# Patient Record
Sex: FEMALE | Race: WHITE | NOT HISPANIC OR LATINO | Employment: FULL TIME | URBAN - METROPOLITAN AREA
[De-identification: names, ages, dates, MRNs, and addresses within clinical notes are randomized per-mention and may not be internally consistent; named-entity substitution may affect disease eponyms.]

---

## 2023-03-13 DIAGNOSIS — F41.9 ANXIETY DISORDER, UNSPECIFIED: ICD-10-CM

## 2023-03-15 RX ORDER — SERTRALINE HYDROCHLORIDE 50 MG/1
TABLET, FILM COATED ORAL
Qty: 90 TABLET | Refills: 1 | Status: SHIPPED | OUTPATIENT
Start: 2023-03-15 | End: 2023-10-09

## 2023-10-07 DIAGNOSIS — J30.9 ALLERGIC RHINITIS, UNSPECIFIED SEASONALITY, UNSPECIFIED TRIGGER: Primary | ICD-10-CM

## 2023-10-07 DIAGNOSIS — F41.9 ANXIETY DISORDER, UNSPECIFIED: ICD-10-CM

## 2023-10-09 RX ORDER — SERTRALINE HYDROCHLORIDE 50 MG/1
TABLET, FILM COATED ORAL
Qty: 90 TABLET | Refills: 1 | Status: SHIPPED | OUTPATIENT
Start: 2023-10-09 | End: 2023-11-15 | Stop reason: SDUPTHER

## 2023-10-09 RX ORDER — LORATADINE 10 MG/1
10 TABLET ORAL NIGHTLY
Qty: 30 TABLET | Refills: 2 | Status: SHIPPED | OUTPATIENT
Start: 2023-10-09 | End: 2023-11-06

## 2023-11-05 DIAGNOSIS — J30.9 ALLERGIC RHINITIS, UNSPECIFIED SEASONALITY, UNSPECIFIED TRIGGER: ICD-10-CM

## 2023-11-06 RX ORDER — LORATADINE 10 MG/1
10 TABLET ORAL NIGHTLY
Qty: 90 TABLET | Refills: 1 | Status: SHIPPED | OUTPATIENT
Start: 2023-11-06

## 2023-11-15 ENCOUNTER — OFFICE VISIT (OUTPATIENT)
Dept: PRIMARY CARE | Facility: CLINIC | Age: 53
End: 2023-11-15
Payer: COMMERCIAL

## 2023-11-15 VITALS
DIASTOLIC BLOOD PRESSURE: 84 MMHG | HEART RATE: 75 BPM | OXYGEN SATURATION: 99 % | BODY MASS INDEX: 40.63 KG/M2 | WEIGHT: 238 LBS | HEIGHT: 64 IN | SYSTOLIC BLOOD PRESSURE: 136 MMHG

## 2023-11-15 DIAGNOSIS — R11.0 NAUSEA: ICD-10-CM

## 2023-11-15 DIAGNOSIS — K21.9 GASTROESOPHAGEAL REFLUX DISEASE WITHOUT ESOPHAGITIS: ICD-10-CM

## 2023-11-15 DIAGNOSIS — Z79.899 CONTROLLED SUBSTANCE AGREEMENT SIGNED: ICD-10-CM

## 2023-11-15 DIAGNOSIS — Z00.00 PHYSICAL EXAM, ANNUAL: Primary | ICD-10-CM

## 2023-11-15 DIAGNOSIS — K58.9 IRRITABLE BOWEL SYNDROME, UNSPECIFIED TYPE: ICD-10-CM

## 2023-11-15 DIAGNOSIS — F41.9 ANXIETY DISORDER, UNSPECIFIED: ICD-10-CM

## 2023-11-15 PROBLEM — J06.9 URTI (ACUTE UPPER RESPIRATORY INFECTION): Status: RESOLVED | Noted: 2023-11-15 | Resolved: 2023-11-15

## 2023-11-15 PROBLEM — M62.838 NECK MUSCLE SPASM: Status: RESOLVED | Noted: 2023-11-15 | Resolved: 2023-11-15

## 2023-11-15 PROBLEM — R11.2 NAUSEA AND VOMITING: Status: ACTIVE | Noted: 2023-11-15

## 2023-11-15 PROBLEM — I95.1 ORTHOSTATIC HYPOTENSION: Status: ACTIVE | Noted: 2023-11-15

## 2023-11-15 PROBLEM — R07.89 ATYPICAL CHEST PAIN: Status: RESOLVED | Noted: 2023-11-15 | Resolved: 2023-11-15

## 2023-11-15 PROBLEM — K13.70 MOUTH LESION: Status: RESOLVED | Noted: 2023-11-15 | Resolved: 2023-11-15

## 2023-11-15 PROBLEM — J18.9 RIGHT LOWER LOBE PNEUMONIA: Status: RESOLVED | Noted: 2023-11-15 | Resolved: 2023-11-15

## 2023-11-15 PROBLEM — J30.2 SEASONAL ALLERGIES: Status: ACTIVE | Noted: 2023-11-15

## 2023-11-15 PROBLEM — J45.909 ASTHMA (HHS-HCC): Status: ACTIVE | Noted: 2023-11-15

## 2023-11-15 PROBLEM — M26.69 TMJ CREPITUS: Status: ACTIVE | Noted: 2023-11-15

## 2023-11-15 PROBLEM — M75.41 IMPINGEMENT SYNDROME OF RIGHT SHOULDER: Status: ACTIVE | Noted: 2023-11-15

## 2023-11-15 PROBLEM — R42 VERTIGO: Status: ACTIVE | Noted: 2023-11-15

## 2023-11-15 PROBLEM — R05.9 COUGH: Status: RESOLVED | Noted: 2023-11-15 | Resolved: 2023-11-15

## 2023-11-15 PROBLEM — R09.89 CHEST CONGESTION: Status: RESOLVED | Noted: 2023-11-15 | Resolved: 2023-11-15

## 2023-11-15 PROBLEM — S61.019A LACERATION OF THUMB: Status: RESOLVED | Noted: 2023-11-15 | Resolved: 2023-11-15

## 2023-11-15 PROBLEM — K59.09 CHRONIC CONSTIPATION: Status: ACTIVE | Noted: 2023-11-15

## 2023-11-15 PROBLEM — R10.9 ABDOMINAL PAIN: Status: RESOLVED | Noted: 2023-11-15 | Resolved: 2023-11-15

## 2023-11-15 PROBLEM — R51.9 HEADACHE: Status: ACTIVE | Noted: 2023-11-15

## 2023-11-15 PROBLEM — S02.2XXA NASAL FRACTURE: Status: RESOLVED | Noted: 2023-11-15 | Resolved: 2023-11-15

## 2023-11-15 PROBLEM — H93.8X3: Status: RESOLVED | Noted: 2023-11-15 | Resolved: 2023-11-15

## 2023-11-15 PROBLEM — R55 SYNCOPE AND COLLAPSE: Status: ACTIVE | Noted: 2023-11-15

## 2023-11-15 PROBLEM — I47.10 PAROXYSMAL SVT (SUPRAVENTRICULAR TACHYCARDIA) (CMS-HCC): Status: ACTIVE | Noted: 2023-11-15

## 2023-11-15 PROBLEM — G47.00 INSOMNIA: Status: ACTIVE | Noted: 2023-11-15

## 2023-11-15 PROBLEM — O24.419 DIABETES, GESTATIONAL (HHS-HCC): Status: ACTIVE | Noted: 2023-11-15

## 2023-11-15 PROBLEM — M79.2 RADICULAR PAIN OF RIGHT UPPER EXTREMITY: Status: RESOLVED | Noted: 2023-11-15 | Resolved: 2023-11-15

## 2023-11-15 PROBLEM — E53.8 VITAMIN B 12 DEFICIENCY: Status: ACTIVE | Noted: 2023-11-15

## 2023-11-15 PROBLEM — M54.12 RIGHT CERVICAL RADICULOPATHY: Status: RESOLVED | Noted: 2023-11-15 | Resolved: 2023-11-15

## 2023-11-15 PROBLEM — M25.511 SHOULDER PAIN, RIGHT: Status: RESOLVED | Noted: 2023-11-15 | Resolved: 2023-11-15

## 2023-11-15 LAB
25(OH)D3 SERPL-MCNC: 10 NG/ML (ref 30–100)
ALBUMIN SERPL BCP-MCNC: 4.5 G/DL (ref 3.4–5)
ALP SERPL-CCNC: 99 U/L (ref 33–110)
ALT SERPL W P-5'-P-CCNC: 17 U/L (ref 7–45)
ANION GAP SERPL CALC-SCNC: 13 MMOL/L (ref 10–20)
AST SERPL W P-5'-P-CCNC: 17 U/L (ref 9–39)
BASOPHILS # BLD AUTO: 0.05 X10*3/UL (ref 0–0.1)
BASOPHILS NFR BLD AUTO: 1 %
BILIRUB SERPL-MCNC: 0.4 MG/DL (ref 0–1.2)
BUN SERPL-MCNC: 12 MG/DL (ref 6–23)
CALCIUM SERPL-MCNC: 9.8 MG/DL (ref 8.6–10.6)
CHLORIDE SERPL-SCNC: 105 MMOL/L (ref 98–107)
CHOLEST SERPL-MCNC: 221 MG/DL (ref 0–199)
CHOLESTEROL/HDL RATIO: 3.1
CO2 SERPL-SCNC: 26 MMOL/L (ref 21–32)
CREAT SERPL-MCNC: 0.93 MG/DL (ref 0.5–1.05)
EOSINOPHIL # BLD AUTO: 0.11 X10*3/UL (ref 0–0.7)
EOSINOPHIL NFR BLD AUTO: 2.2 %
ERYTHROCYTE [DISTWIDTH] IN BLOOD BY AUTOMATED COUNT: 14.2 % (ref 11.5–14.5)
EST. AVERAGE GLUCOSE BLD GHB EST-MCNC: 108 MG/DL
GFR SERPL CREATININE-BSD FRML MDRD: 75 ML/MIN/1.73M*2
GLUCOSE SERPL-MCNC: 97 MG/DL (ref 74–99)
HBA1C MFR BLD: 5.4 %
HCT VFR BLD AUTO: 43.2 % (ref 36–46)
HDLC SERPL-MCNC: 72.1 MG/DL
HGB BLD-MCNC: 13.2 G/DL (ref 12–16)
IMM GRANULOCYTES # BLD AUTO: 0.01 X10*3/UL (ref 0–0.7)
IMM GRANULOCYTES NFR BLD AUTO: 0.2 % (ref 0–0.9)
LDLC SERPL CALC-MCNC: 137 MG/DL
LYMPHOCYTES # BLD AUTO: 1.84 X10*3/UL (ref 1.2–4.8)
LYMPHOCYTES NFR BLD AUTO: 36.5 %
MCH RBC QN AUTO: 26.6 PG (ref 26–34)
MCHC RBC AUTO-ENTMCNC: 30.6 G/DL (ref 32–36)
MCV RBC AUTO: 87 FL (ref 80–100)
MONOCYTES # BLD AUTO: 0.34 X10*3/UL (ref 0.1–1)
MONOCYTES NFR BLD AUTO: 6.7 %
NEUTROPHILS # BLD AUTO: 2.69 X10*3/UL (ref 1.2–7.7)
NEUTROPHILS NFR BLD AUTO: 53.4 %
NON HDL CHOLESTEROL: 149 MG/DL (ref 0–149)
NRBC BLD-RTO: 0 /100 WBCS (ref 0–0)
PLATELET # BLD AUTO: 332 X10*3/UL (ref 150–450)
POTASSIUM SERPL-SCNC: 4.6 MMOL/L (ref 3.5–5.3)
PROT SERPL-MCNC: 6.9 G/DL (ref 6.4–8.2)
RBC # BLD AUTO: 4.97 X10*6/UL (ref 4–5.2)
SODIUM SERPL-SCNC: 139 MMOL/L (ref 136–145)
TRIGL SERPL-MCNC: 60 MG/DL (ref 0–149)
TSH SERPL-ACNC: 1.16 MIU/L (ref 0.44–3.98)
VLDL: 12 MG/DL (ref 0–40)
WBC # BLD AUTO: 5 X10*3/UL (ref 4.4–11.3)

## 2023-11-15 PROCEDURE — 3075F SYST BP GE 130 - 139MM HG: CPT | Performed by: STUDENT IN AN ORGANIZED HEALTH CARE EDUCATION/TRAINING PROGRAM

## 2023-11-15 PROCEDURE — 82306 VITAMIN D 25 HYDROXY: CPT

## 2023-11-15 PROCEDURE — 80345 DRUG SCREENING BARBITURATES: CPT

## 2023-11-15 PROCEDURE — 3079F DIAST BP 80-89 MM HG: CPT | Performed by: STUDENT IN AN ORGANIZED HEALTH CARE EDUCATION/TRAINING PROGRAM

## 2023-11-15 PROCEDURE — 80061 LIPID PANEL: CPT

## 2023-11-15 PROCEDURE — 36415 COLL VENOUS BLD VENIPUNCTURE: CPT

## 2023-11-15 PROCEDURE — 85025 COMPLETE CBC W/AUTO DIFF WBC: CPT

## 2023-11-15 PROCEDURE — 80307 DRUG TEST PRSMV CHEM ANLYZR: CPT

## 2023-11-15 PROCEDURE — 99396 PREV VISIT EST AGE 40-64: CPT | Performed by: STUDENT IN AN ORGANIZED HEALTH CARE EDUCATION/TRAINING PROGRAM

## 2023-11-15 PROCEDURE — 84443 ASSAY THYROID STIM HORMONE: CPT

## 2023-11-15 PROCEDURE — 83036 HEMOGLOBIN GLYCOSYLATED A1C: CPT

## 2023-11-15 PROCEDURE — 80053 COMPREHEN METABOLIC PANEL: CPT

## 2023-11-15 RX ORDER — SERTRALINE HYDROCHLORIDE 50 MG/1
50 TABLET, FILM COATED ORAL DAILY
Qty: 90 TABLET | Refills: 3 | Status: SHIPPED | OUTPATIENT
Start: 2023-11-15

## 2023-11-15 RX ORDER — PANTOPRAZOLE SODIUM 40 MG/1
TABLET, DELAYED RELEASE ORAL
Qty: 90 TABLET | Refills: 3 | Status: SHIPPED | OUTPATIENT
Start: 2023-11-15

## 2023-11-15 RX ORDER — PHENOBARBITAL, HYOSCYAMINE SULFATE, ATROPINE SULFATE AND SCOPOLAMINE HYDROBROMIDE .0194; .1037; 16.2; .0065 MG/1; MG/1; MG/1; MG/1
TABLET ORAL
COMMUNITY
Start: 2023-11-05 | End: 2023-11-15 | Stop reason: SDUPTHER

## 2023-11-15 RX ORDER — GUAIFENESIN 600 MG/1
TABLET, EXTENDED RELEASE ORAL
COMMUNITY

## 2023-11-15 RX ORDER — PANTOPRAZOLE SODIUM 40 MG/1
TABLET, DELAYED RELEASE ORAL
COMMUNITY
End: 2023-11-15 | Stop reason: SDUPTHER

## 2023-11-15 RX ORDER — PHENOBARBITAL, HYOSCYAMINE SULFATE, ATROPINE SULFATE AND SCOPOLAMINE HYDROBROMIDE .0194; .1037; 16.2; .0065 MG/1; MG/1; MG/1; MG/1
TABLET ORAL
Qty: 60 TABLET | Refills: 2 | Status: SHIPPED | OUTPATIENT
Start: 2023-11-15 | End: 2024-03-18

## 2023-11-15 ASSESSMENT — ENCOUNTER SYMPTOMS
CONFUSION: 0
NEUROLOGICAL NEGATIVE: 1
DIZZINESS: 0
WOUND: 0
SLEEP DISTURBANCE: 0
NERVOUS/ANXIOUS: 0
FACIAL ASYMMETRY: 0
HEMATOLOGIC/LYMPHATIC NEGATIVE: 1
POLYPHAGIA: 0
EYES NEGATIVE: 1
LIGHT-HEADEDNESS: 0
MUSCULOSKELETAL NEGATIVE: 1
RESPIRATORY NEGATIVE: 1
PSYCHIATRIC NEGATIVE: 1
GASTROINTESTINAL NEGATIVE: 1
CARDIOVASCULAR NEGATIVE: 1
CONSTITUTIONAL NEGATIVE: 1
WEAKNESS: 0

## 2023-11-15 NOTE — PROGRESS NOTES
"Subjective   Patient ID: Tami Johnson is a 53 y.o. female who presents for Annual Exam (Is fasting).    HPI     Review of Systems    Objective   /84 (BP Location: Right arm, Patient Position: Sitting)   Pulse 75   Ht 1.626 m (5' 4\")   Wt 108 kg (238 lb)   SpO2 99%   BMI 40.85 kg/m²     Physical Exam    Assessment/Plan          "

## 2023-11-15 NOTE — PROGRESS NOTES
"Subjective   Patient ID: Tami Johnson is a 53 y.o. female who presents for Annual Exam (Is fasting).    HPI   Presents to clinic for annual exam  Recently treated for sinus infection at urgent care. Pt states over past several\" lump\" on the outside of her right ear. States it started after she got sick but has somewhat decreased.   Denies any problems with hearing. No pain present.   Also c/o tailbone pain. Worse with sitting denies any falls or recent injury.   Has tried over the counter Topical cream ( icy hot) with relief.    IBS on donnatol from GI states its only thing that works, looked at her GI ntoes    OARRS:  Ander Velazquez, DO on 11/15/2023 11:43 AM  I have personally reviewed the OARRS report for Tami Johnson. I have considered the risks of abuse, dependence, addiction and diversion    Is the patient prescribed a combination of a benzodiazepine and opioid?  Yes, I feel it is clincially indicated to continue the medication and have discussed with the patient risks/benefits/alternatives.    Last Urine Drug Screen / ordered today: No  No results found for this or any previous visit (from the past 8760 hour(s)).  Results are as expected.           Controlled Substance Agreement:  Date of the Last Agreement: 11/15/23  Reviewed Controlled Substance Agreement including but not limited to the benefits, risks, and alternatives to treatment with a Controlled Substance medication(s).    Antidiarrheal:   What is the patient's goal of therapy? Anti diarrha  Is this being achieved with current treatment? yes  Is the patient currently prescribed an opioid, and/or benzodiazepine?   No    Activities of Daily Living:   Is your overall impression that this patient is benefiting (symptom reduction outweighs side effects) from antidiarrheal therapy? Yes     1. Physical Functioning: Better  2. Family Relationship: Better  3. Social Relationship: Better  4. Mood: Better  5. Sleep Patterns: Better  6. Overall Function: " "Better        UTD on Flu vaccine.   UTD on Mammogram screening   UTD on Colorectal screening   Discussed COVID vaccinations.      Review of Systems   Constitutional: Negative.    Eyes: Negative.    Respiratory: Negative.     Cardiovascular: Negative.    Gastrointestinal: Negative.    Endocrine: Negative for polyphagia.   Genitourinary: Negative.    Musculoskeletal: Negative.    Skin: Negative.  Negative for pallor, rash and wound.   Neurological: Negative.  Negative for dizziness, facial asymmetry, weakness and light-headedness.   Hematological: Negative.    Psychiatric/Behavioral: Negative.  Negative for confusion, sleep disturbance and suicidal ideas. The patient is not nervous/anxious.    All other systems reviewed and are negative.      Objective   /84 (BP Location: Right arm, Patient Position: Sitting)   Pulse 75   Ht 1.626 m (5' 4\")   Wt 108 kg (238 lb)   SpO2 99%   BMI 40.85 kg/m²     Physical Exam  Vitals and nursing note reviewed. Exam conducted with a chaperone present.   Constitutional:       Appearance: Normal appearance. She is normal weight.   HENT:      Head: Normocephalic.      Ears:      Comments: Noted with palpable, pain free nodule to near the tragus     Nose: Nose normal.      Mouth/Throat:      Mouth: Mucous membranes are moist.   Eyes:      Pupils: Pupils are equal, round, and reactive to light.   Cardiovascular:      Rate and Rhythm: Normal rate and regular rhythm.   Pulmonary:      Effort: Pulmonary effort is normal.   Abdominal:      General: Abdomen is flat. Bowel sounds are normal.      Palpations: Abdomen is soft.   Genitourinary:     Rectum: Normal.   Musculoskeletal:         General: Normal range of motion.      Cervical back: Normal range of motion.   Skin:     General: Skin is dry.   Neurological:      General: No focal deficit present.      Mental Status: She is alert and oriented to person, place, and time.   Psychiatric:         Mood and Affect: Mood normal.         " Behavior: Behavior normal.         Assessment/Plan   1. Physical exam, annual  - CBC and Auto Differential  - Comprehensive Metabolic Panel  - Lipid Panel  - TSH with reflex to Free T4 if abnormal  - Hemoglobin A1C  - Vitamin D 25-Hydroxy,Total (for eval of Vitamin D levels)    Mammogram/ Colorectal screening UTD  Flu vaccination UTD    2. Anxiety disorder, unspecified  - sertraline (Zoloft) 50 mg tablet; Take 1 tablet (50 mg) by mouth once daily.  Dispense: 90 tablet; Refill: 3    3. Gastroesophageal reflux disease without esophagitis  - pantoprazole (ProtoNix) 40 mg EC tablet; TAKE 1 TABLET BY MOUTH TWICE DAILY 30 MINUTES BEFORE BREAKFAST AND DINNER *NEEDS APPT FOR REFILLS*  Dispense: 90 tablet; Refill: 3    4. Nausea      5. Irritable bowel syndrome, unspecified type  Refill donnatol  [facility]  Oarrs reviwed        FU in clinic in 1 yr. Or sooner if problems arise.

## 2023-11-16 LAB
AMPHETAMINES UR QL SCN: ABNORMAL
BARBITURATES UR QL SCN: ABNORMAL
BENZODIAZ UR QL SCN: ABNORMAL
BZE UR QL SCN: ABNORMAL
CANNABINOIDS UR QL SCN: ABNORMAL
FENTANYL+NORFENTANYL UR QL SCN: ABNORMAL
OPIATES UR QL SCN: ABNORMAL
OXYCODONE+OXYMORPHONE UR QL SCN: ABNORMAL
PCP UR QL SCN: ABNORMAL

## 2023-11-22 LAB
BUTALBITAL, URN, QUANT: <50 NG/ML
PENTOBARBITAL, URN, QUANT: <50 NG/ML
PHENOBARBITAL, URN, QUANT: 3548 NG/ML

## 2024-03-13 ENCOUNTER — APPOINTMENT (OUTPATIENT)
Dept: PRIMARY CARE | Facility: CLINIC | Age: 54
End: 2024-03-13
Payer: COMMERCIAL

## 2024-03-16 DIAGNOSIS — R11.0 NAUSEA: ICD-10-CM

## 2024-03-16 DIAGNOSIS — K58.9 IRRITABLE BOWEL SYNDROME, UNSPECIFIED TYPE: ICD-10-CM

## 2024-03-18 ENCOUNTER — APPOINTMENT (OUTPATIENT)
Dept: PRIMARY CARE | Facility: CLINIC | Age: 54
End: 2024-03-18
Payer: COMMERCIAL

## 2024-03-18 RX ORDER — PHENOBARBITAL, HYOSCYAMINE SULFATE, ATROPINE SULFATE AND SCOPOLAMINE HYDROBROMIDE .0194; .1037; 16.2; .0065 MG/1; MG/1; MG/1; MG/1
TABLET ORAL
Qty: 60 TABLET | Refills: 2 | Status: SHIPPED | OUTPATIENT
Start: 2024-03-18 | End: 2024-04-11 | Stop reason: SDUPTHER

## 2024-04-11 ENCOUNTER — OFFICE VISIT (OUTPATIENT)
Dept: PRIMARY CARE | Facility: CLINIC | Age: 54
End: 2024-04-11
Payer: COMMERCIAL

## 2024-04-11 VITALS — HEIGHT: 64 IN | BODY MASS INDEX: 41.15 KG/M2 | WEIGHT: 241 LBS

## 2024-04-11 DIAGNOSIS — K59.09 CHRONIC CONSTIPATION: ICD-10-CM

## 2024-04-11 DIAGNOSIS — M19.041 ARTHRITIS OF FINGER OF BOTH HANDS: ICD-10-CM

## 2024-04-11 DIAGNOSIS — R42 VERTIGO: Primary | ICD-10-CM

## 2024-04-11 DIAGNOSIS — K58.9 IRRITABLE BOWEL SYNDROME, UNSPECIFIED TYPE: ICD-10-CM

## 2024-04-11 DIAGNOSIS — D50.9 IRON DEFICIENCY ANEMIA, UNSPECIFIED IRON DEFICIENCY ANEMIA TYPE: ICD-10-CM

## 2024-04-11 DIAGNOSIS — N80.9 ENDOMETRIOSIS: ICD-10-CM

## 2024-04-11 DIAGNOSIS — M19.042 ARTHRITIS OF FINGER OF BOTH HANDS: ICD-10-CM

## 2024-04-11 DIAGNOSIS — Z79.899 HIGH RISK MEDICATION USE: ICD-10-CM

## 2024-04-11 LAB
ALBUMIN SERPL BCP-MCNC: 4.3 G/DL (ref 3.4–5)
ALP SERPL-CCNC: 113 U/L (ref 33–110)
ALT SERPL W P-5'-P-CCNC: 19 U/L (ref 7–45)
ANION GAP SERPL CALC-SCNC: 14 MMOL/L (ref 10–20)
AST SERPL W P-5'-P-CCNC: 13 U/L (ref 9–39)
BASOPHILS # BLD AUTO: 0.04 X10*3/UL (ref 0–0.1)
BASOPHILS NFR BLD AUTO: 0.7 %
BILIRUB SERPL-MCNC: 0.4 MG/DL (ref 0–1.2)
BUN SERPL-MCNC: 12 MG/DL (ref 6–23)
CALCIUM SERPL-MCNC: 9.6 MG/DL (ref 8.6–10.6)
CHLORIDE SERPL-SCNC: 105 MMOL/L (ref 98–107)
CO2 SERPL-SCNC: 26 MMOL/L (ref 21–32)
CREAT SERPL-MCNC: 0.83 MG/DL (ref 0.5–1.05)
EGFRCR SERPLBLD CKD-EPI 2021: 85 ML/MIN/1.73M*2
EOSINOPHIL # BLD AUTO: 0.07 X10*3/UL (ref 0–0.7)
EOSINOPHIL NFR BLD AUTO: 1.2 %
ERYTHROCYTE [DISTWIDTH] IN BLOOD BY AUTOMATED COUNT: 14 % (ref 11.5–14.5)
ERYTHROCYTE [SEDIMENTATION RATE] IN BLOOD BY WESTERGREN METHOD: 12 MM/H (ref 0–30)
GLUCOSE SERPL-MCNC: 100 MG/DL (ref 74–99)
HCT VFR BLD AUTO: 44.1 % (ref 36–46)
HGB BLD-MCNC: 13.6 G/DL (ref 12–16)
IMM GRANULOCYTES # BLD AUTO: 0.02 X10*3/UL (ref 0–0.7)
IMM GRANULOCYTES NFR BLD AUTO: 0.4 % (ref 0–0.9)
LYMPHOCYTES # BLD AUTO: 1.53 X10*3/UL (ref 1.2–4.8)
LYMPHOCYTES NFR BLD AUTO: 26.9 %
MCH RBC QN AUTO: 26.4 PG (ref 26–34)
MCHC RBC AUTO-ENTMCNC: 30.8 G/DL (ref 32–36)
MCV RBC AUTO: 86 FL (ref 80–100)
MONOCYTES # BLD AUTO: 0.34 X10*3/UL (ref 0.1–1)
MONOCYTES NFR BLD AUTO: 6 %
NEUTROPHILS # BLD AUTO: 3.69 X10*3/UL (ref 1.2–7.7)
NEUTROPHILS NFR BLD AUTO: 64.8 %
NRBC BLD-RTO: 0 /100 WBCS (ref 0–0)
PLATELET # BLD AUTO: 324 X10*3/UL (ref 150–450)
POTASSIUM SERPL-SCNC: 4.6 MMOL/L (ref 3.5–5.3)
PROT SERPL-MCNC: 6.7 G/DL (ref 6.4–8.2)
RBC # BLD AUTO: 5.15 X10*6/UL (ref 4–5.2)
RHEUMATOID FACT SER NEPH-ACNC: <10 IU/ML (ref 0–15)
SODIUM SERPL-SCNC: 140 MMOL/L (ref 136–145)
URATE SERPL-MCNC: 4.6 MG/DL (ref 2.3–6.7)
WBC # BLD AUTO: 5.7 X10*3/UL (ref 4.4–11.3)

## 2024-04-11 PROCEDURE — 99214 OFFICE O/P EST MOD 30 MIN: CPT | Performed by: FAMILY MEDICINE

## 2024-04-11 PROCEDURE — 86431 RHEUMATOID FACTOR QUANT: CPT

## 2024-04-11 PROCEDURE — 80053 COMPREHEN METABOLIC PANEL: CPT

## 2024-04-11 PROCEDURE — 36415 COLL VENOUS BLD VENIPUNCTURE: CPT

## 2024-04-11 PROCEDURE — 86038 ANTINUCLEAR ANTIBODIES: CPT

## 2024-04-11 PROCEDURE — 85652 RBC SED RATE AUTOMATED: CPT

## 2024-04-11 PROCEDURE — 85025 COMPLETE CBC W/AUTO DIFF WBC: CPT

## 2024-04-11 PROCEDURE — 1036F TOBACCO NON-USER: CPT | Performed by: FAMILY MEDICINE

## 2024-04-11 PROCEDURE — 84550 ASSAY OF BLOOD/URIC ACID: CPT

## 2024-04-11 RX ORDER — NORETHINDRONE 0.35 MG/1
1 TABLET ORAL DAILY
COMMUNITY
Start: 2024-03-16

## 2024-04-11 RX ORDER — PHENOBARBITAL, HYOSCYAMINE SULFATE, ATROPINE SULFATE AND SCOPOLAMINE HYDROBROMIDE .0194; .1037; 16.2; .0065 MG/1; MG/1; MG/1; MG/1
TABLET ORAL
Qty: 60 TABLET | Refills: 3 | Status: SHIPPED | OUTPATIENT
Start: 2024-04-11

## 2024-04-11 ASSESSMENT — PATIENT HEALTH QUESTIONNAIRE - PHQ9
SUM OF ALL RESPONSES TO PHQ9 QUESTIONS 1 AND 2: 0
2. FEELING DOWN, DEPRESSED OR HOPELESS: NOT AT ALL
1. LITTLE INTEREST OR PLEASURE IN DOING THINGS: NOT AT ALL

## 2024-04-11 NOTE — PROGRESS NOTES
Subjective   Tami Johnson is a 53 y.o. female who presents for Follow-up (Patient here was prescription refills today).    HPI  : Patient is a 53-year-old female who needed a refill on a controlled medication today for her irritable bowel syndrome.  She has been through this problem for many years and the Donnatal is the only medication that really helps her and she has seen gastroenterology in the past who apparently recommended.  She will sign the E consent form and I will review her OARRS report.  She also was fasting and wanted some blood work rechecked since she is concerned about her weight, her sugar, and her endometriosis.  If she does not take her specific medication for endometriosis she apparently gets heavy bleeding vaginally.  Patient otherwise has arthritic changes in both her hands and her thumbs ache and we will recheck an arthritic panel and also check her for gout.      Objective  : ROS :10 systems were reviewed and the information is included in the HPI and no additional review of systems is indicated.    Physical Exam  Vitals and nursing note reviewed.   Constitutional:       Appearance: Normal appearance. She is obese.      Comments: Patient is alert and oriented x3.   No acute distress and trying to diet and lose weight.   HENT:      Head: Normocephalic.      Right Ear: Tympanic membrane and external ear normal.      Left Ear: Tympanic membrane and external ear normal.      Ears:      Comments: Ears are patent bilaterally and TMs are clear.     Nose: Nose normal.      Mouth/Throat:      Mouth: Mucous membranes are moist.      Pharynx: Oropharynx is clear.      Comments: Mouth is moist, tongue is midline.  No posterior pharyngeal erythema.  Eyes:      Extraocular Movements: Extraocular movements intact.      Conjunctiva/sclera: Conjunctivae normal.      Pupils: Pupils are equal, round, and reactive to light.      Comments: No visual disturbance, does have her eyes examined once a year.    Neck:      Comments: No carotid bruits, no thyromegaly, no cervical adenopathy.  Occasional neck spasm and restriction of motion secondary to stress and tension.  Cardiovascular:      Rate and Rhythm: Normal rate and regular rhythm.      Pulses: Normal pulses.      Heart sounds: Normal heart sounds.      Comments: Patient denies chest pain and no palpitations.  Heart rhythm is stable S1 and S2 are noted, no ectopics.  Pulmonary:      Effort: Pulmonary effort is normal.      Breath sounds: Normal breath sounds.      Comments: Patient denies any coughing or wheezing.  Lungs are clear to auscultation.    Abdominal:      General: Bowel sounds are normal.      Palpations: Abdomen is soft.      Comments: Abdomen is soft and obese and difficult to evaluate but patient has seen gastroenterology and does have irritable bowel syndrome with constipation.  She states that Donnatal is the only medication that really helps her and she has been on it for years.  Last colonoscopy was in 2020 and she also had an upper endoscopy.   Genitourinary:     Comments: Patient denies dysuria, no hematuria, no nocturia, denies flank pain.  Musculoskeletal:         General: Swelling, tenderness and deformity present. Normal range of motion.      Cervical back: Normal range of motion and neck supple.      Comments: Patient had a previous serious finger laceration that was repaired several years ago.  She does have significant osteo arthritic changes of  interphalangeal joints of both hands.  She would like to have an arthritic profile checked.  Fingers and hands with arthritic   joints, she states her back, hips and knees are stable.  Mild restriction of motion cervical and lumbar spines due to muscle spasm.   Skin:     General: Skin is warm.      Comments: There is no bruising, no erythema, no skin lesions noted, no rashes.   Neurological:      General: No focal deficit present.      Mental Status: She is alert and oriented to person, place,  and time. Mental status is at baseline.      Comments: No focal neurosensory deficits are noted.  Patient denies any peripheral neuropathy.  Coordination and gait are stable.  Normal muscle strength upper and lower extremities.   Psychiatric:         Mood and Affect: Mood normal.         Behavior: Behavior normal.         Thought Content: Thought content normal.         Judgment: Judgment normal.      Comments: Patient has normal mood and affect.  Thought content and judgment are stable.  No signs of vascular dementia.  Behavior is normal.     PLAN : Patient is a 53-year-old female who works as a  for first grade and was in today to have her Donnatal refill.  She did sign the E consent and I did review her OARRS report.  She also wanted blood work checked for  arthritis especially in her fingers.  She also is concerned about her weight gain and being anemic.  These blood test will be drawn today and sent to the lab and she will be notified of the results when available.   Patient is otherwise stable and will follow-up as needed.    Problem List Items Addressed This Visit       Chronic constipation    IBS (irritable bowel syndrome)    Vertigo - Primary    Arthritis of finger of both hands    High risk medication use     Other Visit Diagnoses       Nausea                     Lucio Marvin, DO

## 2024-04-12 LAB — ANA SER QL HEP2 SUBST: NEGATIVE

## 2024-04-12 NOTE — RESULT ENCOUNTER NOTE
Blood sugar kidney and liver function are stable     Red and white blood cell counts are normal the gout level was normal   no gouty arthritis    the rheumatoid factor was negative   the inflammatory test was negative at 12       the HUSSEIN for lupus was negative      no arthritis in the blood-          the arthritis is more osteoarthritis of the joints of the hands and fingers.   She should try the Voltaren gel like we talked about      if they do get worse      she may have to see a hand surgeon for her knuckles.            All the other blood work is stable

## 2024-06-11 DIAGNOSIS — J30.9 ALLERGIC RHINITIS, UNSPECIFIED SEASONALITY, UNSPECIFIED TRIGGER: ICD-10-CM

## 2024-06-12 RX ORDER — LORATADINE 10 MG/1
10 TABLET ORAL NIGHTLY
Qty: 90 TABLET | Refills: 1 | Status: SHIPPED | OUTPATIENT
Start: 2024-06-12

## 2024-07-31 ENCOUNTER — NURSE TRIAGE (OUTPATIENT)
Dept: PRIMARY CARE | Facility: CLINIC | Age: 54
End: 2024-07-31
Payer: COMMERCIAL

## 2024-07-31 DIAGNOSIS — Z12.31 BREAST CANCER SCREENING BY MAMMOGRAM: ICD-10-CM

## 2024-09-10 DIAGNOSIS — K21.9 GASTROESOPHAGEAL REFLUX DISEASE WITHOUT ESOPHAGITIS: ICD-10-CM

## 2024-09-10 RX ORDER — PANTOPRAZOLE SODIUM 40 MG/1
TABLET, DELAYED RELEASE ORAL
Qty: 180 TABLET | Refills: 1 | Status: SHIPPED | OUTPATIENT
Start: 2024-09-10

## 2024-10-04 ENCOUNTER — HOSPITAL ENCOUNTER (OUTPATIENT)
Dept: RADIOLOGY | Facility: HOSPITAL | Age: 54
Discharge: HOME | End: 2024-10-04
Payer: COMMERCIAL

## 2024-10-04 ENCOUNTER — APPOINTMENT (OUTPATIENT)
Dept: PRIMARY CARE | Facility: CLINIC | Age: 54
End: 2024-10-04
Payer: COMMERCIAL

## 2024-10-04 VITALS
SYSTOLIC BLOOD PRESSURE: 142 MMHG | HEART RATE: 81 BPM | OXYGEN SATURATION: 98 % | DIASTOLIC BLOOD PRESSURE: 90 MMHG | WEIGHT: 226 LBS | BODY MASS INDEX: 38.58 KG/M2 | HEIGHT: 64 IN

## 2024-10-04 DIAGNOSIS — J40 BRONCHITIS: ICD-10-CM

## 2024-10-04 DIAGNOSIS — M19.90 OSTEOARTHRITIS, UNSPECIFIED OSTEOARTHRITIS TYPE, UNSPECIFIED SITE: ICD-10-CM

## 2024-10-04 DIAGNOSIS — Z00.00 ROUTINE GENERAL MEDICAL EXAMINATION AT A HEALTH CARE FACILITY: Primary | ICD-10-CM

## 2024-10-04 LAB
ALBUMIN SERPL BCP-MCNC: 4.2 G/DL (ref 3.4–5)
ALP SERPL-CCNC: 112 U/L (ref 33–110)
ALT SERPL W P-5'-P-CCNC: 20 U/L (ref 7–45)
ANION GAP SERPL CALC-SCNC: 12 MMOL/L (ref 10–20)
AST SERPL W P-5'-P-CCNC: 17 U/L (ref 9–39)
BASOPHILS # BLD AUTO: 0.04 X10*3/UL (ref 0–0.1)
BASOPHILS NFR BLD AUTO: 0.5 %
BILIRUB SERPL-MCNC: 0.4 MG/DL (ref 0–1.2)
BUN SERPL-MCNC: 10 MG/DL (ref 6–23)
CALCIUM SERPL-MCNC: 9.5 MG/DL (ref 8.6–10.6)
CHLORIDE SERPL-SCNC: 104 MMOL/L (ref 98–107)
CHOLEST SERPL-MCNC: 216 MG/DL (ref 0–199)
CHOLESTEROL/HDL RATIO: 4.2
CO2 SERPL-SCNC: 29 MMOL/L (ref 21–32)
CREAT SERPL-MCNC: 0.87 MG/DL (ref 0.5–1.05)
EGFRCR SERPLBLD CKD-EPI 2021: 80 ML/MIN/1.73M*2
EOSINOPHIL # BLD AUTO: 0.12 X10*3/UL (ref 0–0.7)
EOSINOPHIL NFR BLD AUTO: 1.6 %
ERYTHROCYTE [DISTWIDTH] IN BLOOD BY AUTOMATED COUNT: 13.4 % (ref 11.5–14.5)
EST. AVERAGE GLUCOSE BLD GHB EST-MCNC: 108 MG/DL
GLUCOSE SERPL-MCNC: 96 MG/DL (ref 74–99)
HBA1C MFR BLD: 5.4 %
HCT VFR BLD AUTO: 44.9 % (ref 36–46)
HDLC SERPL-MCNC: 51.7 MG/DL
HGB BLD-MCNC: 14 G/DL (ref 12–16)
IMM GRANULOCYTES # BLD AUTO: 0.03 X10*3/UL (ref 0–0.7)
IMM GRANULOCYTES NFR BLD AUTO: 0.4 % (ref 0–0.9)
LDLC SERPL CALC-MCNC: 139 MG/DL
LYMPHOCYTES # BLD AUTO: 1.46 X10*3/UL (ref 1.2–4.8)
LYMPHOCYTES NFR BLD AUTO: 19.2 %
MCH RBC QN AUTO: 27.1 PG (ref 26–34)
MCHC RBC AUTO-ENTMCNC: 31.2 G/DL (ref 32–36)
MCV RBC AUTO: 87 FL (ref 80–100)
MONOCYTES # BLD AUTO: 0.41 X10*3/UL (ref 0.1–1)
MONOCYTES NFR BLD AUTO: 5.4 %
NEUTROPHILS # BLD AUTO: 5.56 X10*3/UL (ref 1.2–7.7)
NEUTROPHILS NFR BLD AUTO: 72.9 %
NON HDL CHOLESTEROL: 164 MG/DL (ref 0–149)
NRBC BLD-RTO: 0 /100 WBCS (ref 0–0)
PLATELET # BLD AUTO: 344 X10*3/UL (ref 150–450)
POTASSIUM SERPL-SCNC: 4.3 MMOL/L (ref 3.5–5.3)
PROT SERPL-MCNC: 6.8 G/DL (ref 6.4–8.2)
RBC # BLD AUTO: 5.16 X10*6/UL (ref 4–5.2)
SODIUM SERPL-SCNC: 141 MMOL/L (ref 136–145)
TRIGL SERPL-MCNC: 128 MG/DL (ref 0–149)
TSH SERPL-ACNC: 1.08 MIU/L (ref 0.44–3.98)
VLDL: 26 MG/DL (ref 0–40)
WBC # BLD AUTO: 7.6 X10*3/UL (ref 4.4–11.3)

## 2024-10-04 PROCEDURE — 84443 ASSAY THYROID STIM HORMONE: CPT

## 2024-10-04 PROCEDURE — 3080F DIAST BP >= 90 MM HG: CPT | Performed by: STUDENT IN AN ORGANIZED HEALTH CARE EDUCATION/TRAINING PROGRAM

## 2024-10-04 PROCEDURE — 73030 X-RAY EXAM OF SHOULDER: CPT | Mod: 50

## 2024-10-04 PROCEDURE — 83036 HEMOGLOBIN GLYCOSYLATED A1C: CPT

## 2024-10-04 PROCEDURE — 99396 PREV VISIT EST AGE 40-64: CPT | Performed by: STUDENT IN AN ORGANIZED HEALTH CARE EDUCATION/TRAINING PROGRAM

## 2024-10-04 PROCEDURE — 1036F TOBACCO NON-USER: CPT | Performed by: STUDENT IN AN ORGANIZED HEALTH CARE EDUCATION/TRAINING PROGRAM

## 2024-10-04 PROCEDURE — 80061 LIPID PANEL: CPT

## 2024-10-04 PROCEDURE — 3008F BODY MASS INDEX DOCD: CPT | Performed by: STUDENT IN AN ORGANIZED HEALTH CARE EDUCATION/TRAINING PROGRAM

## 2024-10-04 PROCEDURE — 3077F SYST BP >= 140 MM HG: CPT | Performed by: STUDENT IN AN ORGANIZED HEALTH CARE EDUCATION/TRAINING PROGRAM

## 2024-10-04 PROCEDURE — 85025 COMPLETE CBC W/AUTO DIFF WBC: CPT

## 2024-10-04 PROCEDURE — 80053 COMPREHEN METABOLIC PANEL: CPT

## 2024-10-04 RX ORDER — AZITHROMYCIN 250 MG/1
TABLET, FILM COATED ORAL
Qty: 6 TABLET | Refills: 0 | Status: SHIPPED | OUTPATIENT
Start: 2024-10-04 | End: 2024-10-09

## 2024-10-04 RX ORDER — METHYLPREDNISOLONE 4 MG/1
TABLET ORAL
Qty: 21 TABLET | Refills: 0 | Status: SHIPPED | OUTPATIENT
Start: 2024-10-04 | End: 2024-10-11

## 2024-10-04 ASSESSMENT — ENCOUNTER SYMPTOMS: DEPRESSION: 0

## 2024-10-04 ASSESSMENT — PATIENT HEALTH QUESTIONNAIRE - PHQ9
SUM OF ALL RESPONSES TO PHQ9 QUESTIONS 1 AND 2: 0
1. LITTLE INTEREST OR PLEASURE IN DOING THINGS: NOT AT ALL
2. FEELING DOWN, DEPRESSED OR HOPELESS: NOT AT ALL

## 2024-10-04 NOTE — PROGRESS NOTES
"Subjective   Patient ID: Tami Johnson is a 54 y.o. female who presents for Annual Exam (fasting), Sinus Problem (X1 week), and Shoulder Pain (RT worse than LT/Since April ).    HPI     Presents to clinic for annual exam  Recently treated for sinus infection at urgent care. Pt states over past several\" lump\" on the outside of her right ear. States it started after she got sick but has somewhat decreased.   Denies any problems with hearing. No pain present.   Also c/o tailbone pain. Worse with sitting denies any falls or recent injury.   Has tried over the counter Topical cream ( icy hot) with relief.     IBS on donnatol from GI states its only thing that works, looked at her GI ntoes     OARRS:  No data recorded  I have personally reviewed the OARRS report for Tami Johnson. I have considered the risks of abuse, dependence, addiction and diversion    Is the patient prescribed a combination of a benzodiazepine and opioid?  No    Last Urine Drug Screen / ordered today: No  Recent Results (from the past 8760 hour(s))   Drug Screen, Urine With Reflex to Confirmation    Collection Time: 11/15/23 11:50 AM   Result Value Ref Range    Amphetamine Screen, Urine Presumptive Negative Presumptive Negative    Barbiturate Screen, Urine Presumptive Positive (A) Presumptive Negative    Benzodiazepines Screen, Urine Presumptive Negative Presumptive Negative    Cannabinoid Screen, Urine Presumptive Negative Presumptive Negative    Cocaine Metabolite Screen, Urine Presumptive Negative Presumptive Negative    Fentanyl Screen, Urine Presumptive Negative Presumptive Negative    Opiate Screen, Urine Presumptive Negative Presumptive Negative    Oxycodone Screen, Urine Presumptive Negative Presumptive Negative    PCP Screen, Urine Presumptive Negative Presumptive Negative     Results are as expected.         Controlled Substance Agreement:  Date of the Last Agreement: 2024  Reviewed Controlled Substance Agreement including but " "not limited to the benefits, risks, and alternatives to treatment with a Controlled Substance medication(s).    Benzodiazepines:  What is the patient's goal of therapy? ibs  Is this being achieved with current treatment? yes    SILVIA-7:  No data recorded    Activities of Daily Living:   Is your overall impression that this patient is benefiting (symptom reduction outweighs side effects) from benzodiazepine therapy? No     1. Physical Functioning: Better  2. Family Relationship: Better  3. Social Relationship: Better  4. Mood: Better  5. Sleep Patterns: Better  6. Overall Function: Better    Review of Systems   All other systems reviewed and are negative.      Objective   /90 (BP Location: Right arm, Patient Position: Sitting, BP Cuff Size: Large adult)   Pulse 81   Ht 1.626 m (5' 4\")   Wt 103 kg (226 lb)   SpO2 98%   BMI 38.79 kg/m²     Physical Exam  Constitutional:       Appearance: Normal appearance.   HENT:      Head: Normocephalic and atraumatic.      Right Ear: Tympanic membrane and ear canal normal.      Left Ear: Tympanic membrane and ear canal normal.      Mouth/Throat:      Mouth: Mucous membranes are moist.      Pharynx: Oropharynx is clear.   Eyes:      Extraocular Movements: Extraocular movements intact.      Conjunctiva/sclera: Conjunctivae normal.      Pupils: Pupils are equal, round, and reactive to light.   Cardiovascular:      Rate and Rhythm: Normal rate and regular rhythm.      Pulses: Normal pulses.      Heart sounds: Normal heart sounds.   Pulmonary:      Effort: Pulmonary effort is normal.      Breath sounds: Normal breath sounds.   Abdominal:      General: Abdomen is flat. Bowel sounds are normal.      Palpations: Abdomen is soft.   Musculoskeletal:         General: Normal range of motion.      Cervical back: Normal range of motion and neck supple.   Skin:     General: Skin is warm and dry.      Capillary Refill: Capillary refill takes 2 to 3 seconds.   Neurological:      General: No " focal deficit present.      Mental Status: She is alert and oriented to person, place, and time. Mental status is at baseline.   Psychiatric:         Mood and Affect: Mood normal.         Behavior: Behavior normal.         Thought Content: Thought content normal.         Judgment: Judgment normal.         Assessment/Plan     1. Routine general medical examination at a health care facility    - CBC and Auto Differential  - Comprehensive Metabolic Panel  - Lipid Panel  - TSH with reflex to Free T4 if abnormal  - Hemoglobin A1C    2. Bronchitis    - methylPREDNISolone (Medrol Dospak) 4 mg tablets; Take as directed on package.  Dispense: 21 tablet; Refill: 0  - azithromycin (Zithromax) 250 mg tablet; Take 2 tablets (500 mg) by mouth once daily for 1 day, THEN 1 tablet (250 mg) once daily for 4 days. Take 2 tabs (500 mg) by mouth today, than 1 daily for 4 days..  Dispense: 6 tablet; Refill: 0    3. Osteoarthritis, unspecified osteoarthritis type, unspecified site  Medrol  - consider mobic  - pt/ vs orhto  - XR shoulder right 2+ views; Future  - XR shoulder left 2+ views; Future  d

## 2024-10-12 DIAGNOSIS — J30.9 ALLERGIC RHINITIS, UNSPECIFIED SEASONALITY, UNSPECIFIED TRIGGER: ICD-10-CM

## 2024-10-14 RX ORDER — LORATADINE 10 MG/1
10 TABLET ORAL NIGHTLY
Qty: 90 TABLET | Refills: 1 | Status: SHIPPED | OUTPATIENT
Start: 2024-10-14

## 2024-11-11 DIAGNOSIS — M19.90 OSTEOARTHRITIS, UNSPECIFIED OSTEOARTHRITIS TYPE, UNSPECIFIED SITE: Primary | ICD-10-CM

## 2024-11-11 NOTE — PROGRESS NOTES
Subjective   Reason for Visit: Tami Johnson is an 54 y.o. female here for a Medicare Wellness visit.               HPI    Patient Care Team:  Ander Velazquez DO as PCP - General  Lucio Marvin DO as PCP - [facility] PCP     Review of Systems    Objective   Vitals:  There were no vitals taken for this visit.      Physical Exam    Assessment & Plan

## 2024-11-25 ENCOUNTER — APPOINTMENT (OUTPATIENT)
Dept: PHYSICAL THERAPY | Facility: CLINIC | Age: 54
End: 2024-11-25
Payer: COMMERCIAL

## 2024-12-31 ENCOUNTER — EVALUATION (OUTPATIENT)
Dept: PHYSICAL THERAPY | Facility: CLINIC | Age: 54
End: 2024-12-31
Payer: COMMERCIAL

## 2024-12-31 DIAGNOSIS — M19.011 OSTEOARTHRITIS OF BOTH SHOULDERS, UNSPECIFIED OSTEOARTHRITIS TYPE: ICD-10-CM

## 2024-12-31 DIAGNOSIS — M19.90 OSTEOARTHRITIS: Primary | ICD-10-CM

## 2024-12-31 DIAGNOSIS — M19.90 OSTEOARTHRITIS, UNSPECIFIED OSTEOARTHRITIS TYPE, UNSPECIFIED SITE: ICD-10-CM

## 2024-12-31 DIAGNOSIS — M19.012 OSTEOARTHRITIS OF BOTH SHOULDERS, UNSPECIFIED OSTEOARTHRITIS TYPE: ICD-10-CM

## 2024-12-31 PROCEDURE — 97161 PT EVAL LOW COMPLEX 20 MIN: CPT | Mod: GP

## 2024-12-31 PROCEDURE — 97110 THERAPEUTIC EXERCISES: CPT | Mod: GP

## 2024-12-31 ASSESSMENT — ENCOUNTER SYMPTOMS
LOSS OF SENSATION IN FEET: 0
OCCASIONAL FEELINGS OF UNSTEADINESS: 0
DEPRESSION: 0

## 2024-12-31 NOTE — PROGRESS NOTES
Physical Therapy    Physical Therapy Evaluation and Treatment      Patient Name: Tami Johnson  MRN: [id number]  Today's Date: 12/31/2024  Time Calculation  Start Time: 1218  Stop Time: 1300  Time Calculation (min): 42 min    Insurance - reviewed   Visit number: 1 (updated 12/31/24)   Payor: [employer] [facility] [place] / Plan: [employer] / Product Type: *No Product type* /    $15 co pay, no auth needed      Referring Provider: Ander Velazquez DO   Surgery: No surgery found, No surgery found.  Days since surgery: No surgery found       Assessment:      Tami Johnson  is a 54 y.o. old patient who participated in a physical therapy evaluation today due to B shoulder pain. Tami presents with signs and symptoms consistent with potential RTC involvement. Tami's impairments include: postural deficits, pain, decreased strength, decreased range of motion, and decreased functional mobility.   Due to these impairments, she has the following functional limitations and participation restrictions: difficulty performing household activities, difficulty performing recreational activities, difficulty performing occupational activities, difficulty with sleeping, difficulty with completing/performing some ADLs/IADLs, and increased time to complete ADLs/IADLs. Tami's clinical presentation is Stable and/or uncomplicated characteristics with the level of complexity being low. Tami's potential for rehab is good.  Skilled physical therapy services are appropriate and beneficial in order to achieve measurable and meaningful change in the objective tests and measures. Utilization of skilled physical therapy services will aid in advancing her functional status and attaining her therapy-related goals. Tami verbalized understanding and is in agreement with all goals and plan of care.  Tami left session with all questions answered and no increase in symptoms.      Plan:  Shoulder strengthening and ROM,  modalities for pain       Planned Interventions: Therapeutic Exercise, Therapeutic Activity, Manual Therapy, Neuromuscular Re-Education, E-stim, Ultrasound, Aquatic Therapy  Frequency and Duration: 1x/week for 6 weeks    Plan of Care Agreement: Pt had input in and is agreeable to POC.     Current Problem:   Problem List Items Addressed This Visit             ICD-10-CM    Osteoarthritis - Primary M19.90    Relevant Orders    Follow Up In Physical Therapy    Follow Up In Physical Therapy         Subjective    Pt reports that that a few years ago she was moving and helping her children move which irritated the neck and upper back. This gets re-aggravated when she does strenuous activity. Also reports that about a year ago she was assisting her mother who could not move very well. She had trouble lifting her and re positioning her. Reports that her R shoulder is in more discomfort than the L. States that she is a side sleeper which can be irritating at times. Reports that she has issues with shoulder rotation and any activity that involves muscular endurance of the shoulder.     General:  General  Reason for Referral: B shoulder pain  Referred By: CORBY Velazqueze Alex  is a 54 y.o. female  presenting to the clinic with chief complaint of B shoulder pain.     Tami Alex  reports symptoms began April 2024.     Reports symptoms are better with Tylenol, Advil, ice, heat.     Reports symptoms are worse with reaching behind the back, heavy lifting, pulling, pushing, shoulder endurance activities.     Tami Johnson  denies:  numbness and tingling    Tami Johnson  confirms: N/A    Prior Treatment/diagnostic images: XR, physician visits     Medical History Form: Reviewed (scanned into chart)    Living Environment: single level house    Occupation: Employed full-time -       Prior Level of Function: fully functional with no limitations     Exercise:  N/A    Functional  Limitations: reaching behind the back, heavy lifting, pulling, pushing, shoulder endurance activities.     Pt goals for therapy: decrease pain, improve function     Precautions:  Fall Risk: None   Denies: Cancer, Pacemaker, Diabetes, Hypertension, other known cardiac problems, and other known pulmonary problems  Past Surgical history: N/A  Past Medical history: N/A    Pain:  3/10  pain location: B shoulders (R>L)     Pain description: achy, sore       Objective Measures:   Objective     Posture: mild forward head, rounded shoulders       ROM: (WFL unless otherwise noted)   R shoulder flexion: 116  L shoulder flexion: 150  R shoulder abduction: 115  L shoulder abduction: 122  R shoulder ER: C7  L shoulder ER: C7  R shoulder IR: S1  L shoulder IR: T12    R elbow flexion:   L elbow flexion:   R elbow extension:  L elbow extension:       MMT:  R shoulder flexion: 4+, pain   L shoulder flexion: 4, pain   R shoulder abduction: 4, pain   L shoulder abduction: 4+  R shoulder ER: 4+  L shoulder ER: 4+  R shoulder IR: 4+  L shoulder IR: 4+    R elbow flexion: 5  L elbow flexion: 5  R elbow extension: 4+, pain   L elbow extension: 4+, pain       Special Tests: empty can (+) on R, hernadez chu (-) on R, speeds test (?) on R    Tenderness: B upper trap, B glenohumeral joint line posterior/lateral       Outcome Measures:  Other Measures  Disability of Arm Shoulder Hand (DASH): 40.90     Treatments: * indicated new exercises for HEP      Therapeutic Exercise:  12 minutes    - shoulder flexion wall slides x 5 on R  - rows green TB x 10   - shoulder extensions green TB x 10   - shoulder IR/ER green TB x10 ea B  - cane AAROM to shoulder flexion, abd, ER x 10 ea on R      EDUCATION:     -Educated Tami  on the role of PT and PT POC  -Educated Tami regarding benefit and purpose of skilled PT services along with results of examination findings and how this correlates to their chief complaint.   -Answered Tami's questions  in full.  -Educated Tami on relevant anatomy and the rationale for exercises  -Tami Hamiltonllo advised to hold any ex if it increases pain, Tami Johnson verbalized understanding    Goals:    STG's (within 2 weeks)  Tami Johnson will decrease pain by >/= 2/10 points from baseline to improve ability to perform household/recreational activities.    Tami Johnson will be able to sleep through the night with less than 2 interruptions due to pain in order to improve mental and physical well-being in order to safely participate in ADLs.     Tami Johnson will demonstrate independence,  excellent understanding of and report compliance with at least 3 exercises in her HEP to facilitate the learning process to maximize PT benefits and to facilitate independent rehab program upon discharge.    LTG's (by discharge)    Tami will increase B shoulder ROM to WFL to improve ability to perform household/recreational activities.     Tami will increase B shoulder strength to 5/5 to improve ability to perform household/recreational activities.      Tami Alex will decrease pain to 0-2/10 to improve ability to perform household/recreational activities.    Tami Johnson will be able to sleep through the night without waking due to pain in order to improve mental and physical well-being in order to safely participate in ADLs.     Tami Johnson will demonstrate independence,  excellent understanding of and report compliance with HEP to facilitate the learning process to maximize PT benefits and to facilitate independent rehab program upon discharge.    Time Calculation  Start Time: 1218  Stop Time: 1300  Time Calculation (min): 42 min  PT Evaluation Time Entry  PT Evaluation (Low) Time Entry: 30  PT Therapeutic Procedures Time Entry  Therapeutic Exercise Time Entry: 12

## 2025-01-05 DIAGNOSIS — K58.9 IRRITABLE BOWEL SYNDROME, UNSPECIFIED TYPE: ICD-10-CM

## 2025-01-06 RX ORDER — PHENOBARBITAL, HYOSCYAMINE SULFATE, ATROPINE SULFATE AND SCOPOLAMINE HYDROBROMIDE .0194; .1037; 16.2; .0065 MG/1; MG/1; MG/1; MG/1
TABLET ORAL
Qty: 60 TABLET | Refills: 2 | Status: SHIPPED | OUTPATIENT
Start: 2025-01-06

## 2025-01-09 ENCOUNTER — TREATMENT (OUTPATIENT)
Dept: PHYSICAL THERAPY | Facility: CLINIC | Age: 55
End: 2025-01-09
Payer: COMMERCIAL

## 2025-01-09 DIAGNOSIS — M19.90 OSTEOARTHRITIS: ICD-10-CM

## 2025-01-09 DIAGNOSIS — M19.90 OSTEOARTHRITIS, UNSPECIFIED OSTEOARTHRITIS TYPE, UNSPECIFIED SITE: ICD-10-CM

## 2025-01-09 PROCEDURE — 97110 THERAPEUTIC EXERCISES: CPT | Mod: GP,CQ

## 2025-01-09 PROCEDURE — 97140 MANUAL THERAPY 1/> REGIONS: CPT | Mod: GP,CQ

## 2025-01-09 NOTE — PROGRESS NOTES
Physical Therapy    Physical Therapy Treatment      Patient Name: Tami Johnson  MRN: [id number]  Today's Date: 1/9/2025  Time Calculation  Start Time: 0500  Stop Time: 0530  Time Calculation (min): 30 min    Insurance - reviewed   Visit number: 2 (updated 01/09/25)   Payor: JAVI / Plan: [employer]  HEALTHCARE / Product Type: *No Product type* /    $15 co pay, no auth needed      Referring Provider: Ander Velazquez DO   Surgery: No surgery found, No surgery found.  Days since surgery: No surgery found       Assessment:   Treatment time shortened d/t patient arriving late. Pt able to complete all exercises w/o increased complaints of shoulder pain. Pt requires verbal and tactile cues to perform shoulder IR and ER properly. Pt displays tightness of B UT and rtc. Reviewed pillow positioning to improve tolerance of side sleeping. Pt denies increased shoulder pain following treatment session.     Plan:  Shoulder strengthening and ROM, modalities for pain       Planned Interventions: Therapeutic Exercise, Therapeutic Activity, Manual Therapy, Neuromuscular Re-Education, E-stim, Ultrasound, Aquatic Therapy  Frequency and Duration: 1x/week for 6 weeks    Plan of Care Agreement: Pt had input in and is agreeable to POC.     Current Problem:   Problem List Items Addressed This Visit             ICD-10-CM    Osteoarthritis M19.90         Subjective    Pt states that she would like to review some of her exercises today. Pt states that she is still having pain when sleeping at night. Pt states that she is also having some lower neck soreness today.     General:       Tami Johnson  is a 54 y.o. female  presenting to the clinic with chief complaint of B shoulder pain.     Tami Johnson  reports symptoms began April 2024.     Reports symptoms are better with Tylenol, Advil, ice, heat.     Reports symptoms are worse with reaching behind the back, heavy lifting, pulling, pushing, shoulder endurance activities.     Tami  Alex  denies:  numbness and tingling    Tami Johnson  confirms: N/A    Prior Treatment/diagnostic images: XR, physician visits     Medical History Form: Reviewed (scanned into chart)    Living Environment: single level house    Occupation: Employed full-time -       Prior Level of Function: fully functional with no limitations     Exercise:  N/A    Functional Limitations: reaching behind the back, heavy lifting, pulling, pushing, shoulder endurance activities.     Pt goals for therapy: decrease pain, improve function     Precautions:  Fall Risk: None   Denies: Cancer, Pacemaker, Diabetes, Hypertension, other known cardiac problems, and other known pulmonary problems  Past Surgical history: N/A  Past Medical history: N/A    Pain:  4/10  pain location: B shoulders (R>L)     Pain description: achy, sore       Objective Measures:   Objective     Posture: mild forward head, rounded shoulders     Outcome Measures:        Treatments: * indicated new exercises for HEP    Therapeutic Exercise:  15 minutes    - shoulder flexion wall slides x 5 on R  - rows green TB 2 x 10   - shoulder extensions green TB 2 x 10  - shoulder IR/ER green TB x10 ea B     NP:  - cane AAROM to shoulder flexion, abd, ER x 10 ea on R    Manual: 15 minutes  Stm of B UT, rhomboids, RTC, lower cervical region  Trp of B UT's and rtc    Skin intact following    EDUCATION:     -Educated Tami  on the role of PT and PT POC  -Educated Tami regarding benefit and purpose of skilled PT services along with results of examination findings and how this correlates to their chief complaint.   -Answered Tami's questions in full.  -Educated Tami on relevant anatomy and the rationale for exercises  -Tami Johnson advised to hold any ex if it increases pain, Tami Johnson verbalized understanding    Goals:    STG's (within 2 weeks)  Tami Johnson will decrease pain by >/= 2/10 points from baseline to  improve ability to perform household/recreational activities.    Tami Johnson will be able to sleep through the night with less than 2 interruptions due to pain in order to improve mental and physical well-being in order to safely participate in ADLs.     Tami Johnson will demonstrate independence,  excellent understanding of and report compliance with at least 3 exercises in her HEP to facilitate the learning process to maximize PT benefits and to facilitate independent rehab program upon discharge.    LTG's (by discharge)    Tami will increase B shoulder ROM to WFL to improve ability to perform household/recreational activities.     Tami will increase B shoulder strength to 5/5 to improve ability to perform household/recreational activities.      Tami Johnson will decrease pain to 0-2/10 to improve ability to perform household/recreational activities.    Tami Johnson will be able to sleep through the night without waking due to pain in order to improve mental and physical well-being in order to safely participate in ADLs.     Tami Johnson will demonstrate independence,  excellent understanding of and report compliance with HEP to facilitate the learning process to maximize PT benefits and to facilitate independent rehab program upon discharge.    Time Calculation  Start Time: 0500  Stop Time: 0530  Time Calculation (min): 30 min     PT Therapeutic Procedures Time Entry  Manual Therapy Time Entry: 15  Therapeutic Exercise Time Entry: 15

## 2025-01-16 ENCOUNTER — TREATMENT (OUTPATIENT)
Dept: PHYSICAL THERAPY | Facility: CLINIC | Age: 55
End: 2025-01-16
Payer: COMMERCIAL

## 2025-01-16 DIAGNOSIS — M19.90 OSTEOARTHRITIS: ICD-10-CM

## 2025-01-16 DIAGNOSIS — M19.90 OSTEOARTHRITIS, UNSPECIFIED OSTEOARTHRITIS TYPE, UNSPECIFIED SITE: ICD-10-CM

## 2025-01-16 PROCEDURE — 97110 THERAPEUTIC EXERCISES: CPT | Mod: GP,CQ

## 2025-01-16 PROCEDURE — 97140 MANUAL THERAPY 1/> REGIONS: CPT | Mod: GP,CQ

## 2025-01-16 NOTE — PROGRESS NOTES
Physical Therapy    Physical Therapy Treatment      Patient Name: Tami Johnson  MRN: [id number]  Today's Date: 1/16/2025  Time Calculation  Start Time: 0450  Stop Time: 0530  Time Calculation (min): 40 min    Insurance - reviewed   Visit number: 3 (updated 01/16/25)   Payor: JAVI / Plan: [employer]  HEALTHCARE / Product Type: *No Product type* /    $15 co pay, no auth needed      Referring Provider: Ander Velazquez DO   Surgery: No surgery found, No surgery found.  Days since surgery: No surgery found       Assessment:  Pt challenged w/ supine aarom cane flexion d/t increased shoulder pain. Pt requires verbal and tactile cues to perform cane shoulder ER w/ proper form. Pt displays tightness and trigger points in B UT's and rhomboids. Cp applied following treatment session to reduce shoulder soreness.     Plan:  Shoulder strengthening and ROM, modalities for pain       Planned Interventions: Therapeutic Exercise, Therapeutic Activity, Manual Therapy, Neuromuscular Re-Education, E-stim, Ultrasound, Aquatic Therapy  Frequency and Duration: 1x/week for 6 weeks    Plan of Care Agreement: Pt had input in and is agreeable to POC.     Current Problem:   Problem List Items Addressed This Visit             ICD-10-CM    Osteoarthritis M19.90       Subjective    Pt states that her shoulder does not hurt too bad today if she avoids painful activities. Pt reports that she is having some pain w/ her cane exercises at home. Pt states that she went to an OB appointment yesterday and was not able to get her L arm over her head for the breast exam. HEP: Pt completes 1x a day and does not complete every day.     General:     Functional Limitations: reaching behind the back, heavy lifting, pulling, pushing, shoulder endurance activities.     Pt goals for therapy: decrease pain, improve function     Precautions:  Fall Risk: None   Denies: Cancer, Pacemaker, Diabetes, Hypertension, other known cardiac problems, and other known pulmonary  problems  Past Surgical history: N/A  Past Medical history: N/A    Pain:  4/10  pain location: B shoulders (R>L)     Pain description: achy, sore       Objective Measures:   Objective     Posture: mild forward head, rounded shoulders     Outcome Measures:        Treatments: * indicated new exercises for HEP    Therapeutic Exercise:  30 minutes    - shoulder pulley flexion x 3 minutes  - shoulder flexion wall slides 10 x 5 sec hold   - rows green TB 2 x 10   - shoulder extensions green TB 2 x 10  - B shoulder ER 2 x 10, red TB  - B shoulder horizontal abduction 2 x 10, red TB  - shoulder IR/ER green TB x10 ea B: np  - cane AAROM to shoulder flexion, abd, ER x 10  - supine shoulder flexion 10x ea (only to 90 degrees)  - s/l shoulder abduction 10x       Manual: 10 minutes  Stm of B UT, rhomboids, RTC, lower cervical region  Trp of B UT's and rtc    Skin intact following    EDUCATION:     -Educated Tami  on the role of PT and PT POC  -Educated Tami regarding benefit and purpose of skilled PT services along with results of examination findings and how this correlates to their chief complaint.   -Answered Tami's questions in full.  -Educated Tami on relevant anatomy and the rationale for exercises  -Tami Johnson advised to hold any ex if it increases pain, Tami Johnson verbalized understanding    Goals:    STG's (within 2 weeks)  Tami Johnson will decrease pain by >/= 2/10 points from baseline to improve ability to perform household/recreational activities.    Tami Johnson will be able to sleep through the night with less than 2 interruptions due to pain in order to improve mental and physical well-being in order to safely participate in ADLs.     Tami Johnson will demonstrate independence,  excellent understanding of and report compliance with at least 3 exercises in her HEP to facilitate the learning process to maximize PT benefits and to facilitate independent rehab  program upon discharge.    LTG's (by discharge)    Tami will increase B shoulder ROM to WFL to improve ability to perform household/recreational activities.     Tami will increase B shoulder strength to 5/5 to improve ability to perform household/recreational activities.      Tami Johnson will decrease pain to 0-2/10 to improve ability to perform household/recreational activities.    Tami Alex will be able to sleep through the night without waking due to pain in order to improve mental and physical well-being in order to safely participate in ADLs.     Tami Johnson will demonstrate independence,  excellent understanding of and report compliance with HEP to facilitate the learning process to maximize PT benefits and to facilitate independent rehab program upon discharge.    Time Calculation  Start Time: 0450  Stop Time: 0530  Time Calculation (min): 40 min     PT Therapeutic Procedures Time Entry  Manual Therapy Time Entry: 10  Therapeutic Exercise Time Entry: 30

## 2025-01-23 ENCOUNTER — TREATMENT (OUTPATIENT)
Dept: PHYSICAL THERAPY | Facility: CLINIC | Age: 55
End: 2025-01-23
Payer: COMMERCIAL

## 2025-01-23 DIAGNOSIS — M19.90 OSTEOARTHRITIS, UNSPECIFIED OSTEOARTHRITIS TYPE, UNSPECIFIED SITE: ICD-10-CM

## 2025-01-23 DIAGNOSIS — M19.90 OSTEOARTHRITIS: ICD-10-CM

## 2025-01-23 PROCEDURE — 97140 MANUAL THERAPY 1/> REGIONS: CPT | Mod: GP

## 2025-01-23 PROCEDURE — 97110 THERAPEUTIC EXERCISES: CPT | Mod: GP

## 2025-01-23 NOTE — PROGRESS NOTES
Physical Therapy    Physical Therapy Treatment      Patient Name: Tami Johnson  MRN: [id number]  Today's Date: 1/23/2025  Time Calculation  Start Time: 1730  Stop Time: 1815  Time Calculation (min): 45 min    Insurance - reviewed   Visit number: 4 (updated 01/23/25)   Payor: JAVI / Plan: JAVI  HEALTHCARE / Product Type: *No Product type* /    $15 co pay, no auth needed      Referring Provider: Ander Velazquez DO   Surgery: No surgery found, No surgery found.  Days since surgery: No surgery found       Assessment:  Patient presents with continued decreased shoulder ROM. Pain with bilateral shoulder flexion. Able to tolerate addition of side lying ER strengthening without any increased symptoms. PROM smooth. Tightness noted throughout bilateral upper trap, levator, greater on right vs. Left. Decreased pain with manual therapy.     Plan:  Shoulder strengthening and ROM, modalities for pain       Planned Interventions: Therapeutic Exercise, Therapeutic Activity, Manual Therapy, Neuromuscular Re-Education, E-stim, Ultrasound, Aquatic Therapy  Frequency and Duration: 1x/week for 6 weeks    Plan of Care Agreement: Pt had input in and is agreeable to POC.     Current Problem:   Problem List Items Addressed This Visit             ICD-10-CM    Osteoarthritis M19.90       Subjective    Patient reports that sometimes her shoulders feel better and sometimes they don't. She was able to go in the pool and the water helped to relax her shoulders.     General:     Functional Limitations: reaching behind the back, heavy lifting, pulling, pushing, shoulder endurance activities.     Pt goals for therapy: decrease pain, improve function     Precautions:  Fall Risk: None   Denies: Cancer, Pacemaker, Diabetes, Hypertension, other known cardiac problems, and other known pulmonary problems  Past Surgical history: N/A  Past Medical history: N/A    Pain:  2/10  pain location: B shoulders (R>L)     Pain description: achy, sore        Objective Measures:   Objective     Posture: mild forward head, rounded shoulders     Outcome Measures:        Treatments: * indicated new exercises for HEP    Therapeutic Exercise:  30 minutes    - shoulder pulley flexion x 3 minutes  - shoulder flexion wall slides 10 x 5 sec hold   - rows green TB 2 x 10   - shoulder extensions green TB 2 x 10  - B shoulder ER 2 x 10, red TB  - B shoulder horizontal abduction 2 x 10, red TB  - shoulder IR/ER green TB x10 ea B: np  - cane AAROM to shoulder flexion, abd, ER x 10  - supine shoulder flexion 10x ea (only to 90 degrees)  - s/l shoulder abduction 10x   - s/l ER 2 x 10 1 lb       Manual: 10 minutes  Stm of B UT, rhomboids, RTC, lower cervical region  Trp of B UT's and rtc    PROM flexion, ER , abd R/L shoulder  Grade II/III inferior / posterior GH mobilization R/L shoulder   Skin intact following    EDUCATION:     -Educated Tami  on the role of PT and PT POC  -Educated Tami regarding benefit and purpose of skilled PT services along with results of examination findings and how this correlates to their chief complaint.   -Answered Tami's questions in full.  -Educated Tami on relevant anatomy and the rationale for exercises  -Tami Johnson advised to hold any ex if it increases pain, Tami Johnson verbalized understanding    Goals:    STG's (within 2 weeks)  Tami Johnson will decrease pain by >/= 2/10 points from baseline to improve ability to perform household/recreational activities.    Tami Alex will be able to sleep through the night with less than 2 interruptions due to pain in order to improve mental and physical well-being in order to safely participate in ADLs.     Tami Alex will demonstrate independence,  excellent understanding of and report compliance with at least 3 exercises in her HEP to facilitate the learning process to maximize PT benefits and to facilitate independent rehab program upon  discharge.    LTG's (by discharge)    Tami will increase B shoulder ROM to WFL to improve ability to perform household/recreational activities.     Tami will increase B shoulder strength to 5/5 to improve ability to perform household/recreational activities.      Tami Johnson will decrease pain to 0-2/10 to improve ability to perform household/recreational activities.    Tami Johnson will be able to sleep through the night without waking due to pain in order to improve mental and physical well-being in order to safely participate in ADLs.     Tami Johnson will demonstrate independence,  excellent understanding of and report compliance with HEP to facilitate the learning process to maximize PT benefits and to facilitate independent rehab program upon discharge.    Time Calculation  Start Time: 1730  Stop Time: 1815  Time Calculation (min): 45 min     PT Therapeutic Procedures Time Entry  Manual Therapy Time Entry: 15  Therapeutic Exercise Time Entry: 30

## 2025-01-30 ENCOUNTER — TREATMENT (OUTPATIENT)
Dept: PHYSICAL THERAPY | Facility: CLINIC | Age: 55
End: 2025-01-30
Payer: COMMERCIAL

## 2025-01-30 DIAGNOSIS — M19.90 OSTEOARTHRITIS, UNSPECIFIED OSTEOARTHRITIS TYPE, UNSPECIFIED SITE: ICD-10-CM

## 2025-01-30 DIAGNOSIS — M19.90 OSTEOARTHRITIS: ICD-10-CM

## 2025-01-30 PROCEDURE — 97140 MANUAL THERAPY 1/> REGIONS: CPT | Mod: GP

## 2025-01-30 PROCEDURE — 97110 THERAPEUTIC EXERCISES: CPT | Mod: GP

## 2025-01-30 NOTE — PROGRESS NOTES
Physical Therapy    Physical Therapy Treatment      Patient Name: Tami Johnson  MRN: [id number]  Today's Date: 1/30/2025  Time Calculation  Start Time: 1650  Stop Time: 1730  Time Calculation (min): 40 min    Insurance - reviewed   Visit number: 5 (updated 01/30/25)   Payor: JAVI / Plan: [employer]  HEALTHCARE / Product Type: *No Product type* /    $15 co pay, no auth needed      Referring Provider: Ander Velazquez DO   Surgery: No surgery found, No surgery found.  Days since surgery: No surgery found       Assessment:  Presents with continued bilateral shoulder pain. PROM smooth on right, some catching on left. Fatigued with shoulder ER. Continues to present with limited ROM and decreased ability to complete overhead reaching, lifting activities.     Plan:  Shoulder strengthening and ROM, modalities for pain       Planned Interventions: Therapeutic Exercise, Therapeutic Activity, Manual Therapy, Neuromuscular Re-Education, E-stim, Ultrasound, Aquatic Therapy  Frequency and Duration: 1x/week for 6 weeks    Plan of Care Agreement: Pt had input in and is agreeable to POC.     Current Problem:   Problem List Items Addressed This Visit             ICD-10-CM    Osteoarthritis M19.90         Subjective    Reports that her shoulders are feeling ok today. She continues to avoid doing anything that aggravates her shoulder.     General:     Functional Limitations: reaching behind the back, heavy lifting, pulling, pushing, shoulder endurance activities.     Pt goals for therapy: decrease pain, improve function     Precautions:  Fall Risk: None   Denies: Cancer, Pacemaker, Diabetes, Hypertension, other known cardiac problems, and other known pulmonary problems  Past Surgical history: N/A  Past Medical history: N/A    Pain:  2/10  pain location: B shoulders (R>L)     Pain description: achy, sore       Objective Measures:   Objective     Posture: mild forward head, rounded shoulders     Outcome Measures:        Treatments: *  "indicated new exercises for HEP    Therapeutic Exercise:  25 minutes    - shoulder pulley flexion x 3 minutes  - shoulder flexion wall slides 10 x 5 sec hold   - pec doorway stretch, low 3 x 30\"  - rows green TB 2 x 10   - shoulder extensions green TB 2 x 10  - B shoulder ER 2 x 10, red TB  - B shoulder horizontal abduction 2 x 10, red TB  - ball up wall flexion stretch 20x 10\"  - shoulder IR/ER green TB x10 ea B: np  - cane AAROM to shoulder flexion, abd, ER x 10  - supine shoulder flexion 10x ea (only to 90 degrees)  - s/l shoulder abduction 10x   - s/l ER 2 x 10 1 lb       Manual: 10 minutes  Stm of B UT, rhomboids, RTC, lower cervical region  Trp of B UT's and rtc    PROM flexion, ER , abd R/L shoulder  Grade II/III inferior / posterior GH mobilization R/L shoulder   Skin intact following    EDUCATION:     -Educated Tami  on the role of PT and PT POC  -Educated Tami regarding benefit and purpose of skilled PT services along with results of examination findings and how this correlates to their chief complaint.   -Answered Tami's questions in full.  -Educated Tami on relevant anatomy and the rationale for exercises  -Tami Johnson advised to hold any ex if it increases pain, Tami Johnson verbalized understanding    Goals:    STG's (within 2 weeks)  Tami Johnson will decrease pain by >/= 2/10 points from baseline to improve ability to perform household/recreational activities.    Tami Alex will be able to sleep through the night with less than 2 interruptions due to pain in order to improve mental and physical well-being in order to safely participate in ADLs.     Tami Johnson will demonstrate independence,  excellent understanding of and report compliance with at least 3 exercises in her HEP to facilitate the learning process to maximize PT benefits and to facilitate independent rehab program upon discharge.    LTG's (by discharge)    Tami will increase B " shoulder ROM to WFL to improve ability to perform household/recreational activities.     Tami will increase B shoulder strength to 5/5 to improve ability to perform household/recreational activities.      Tami Hamiltonllo will decrease pain to 0-2/10 to improve ability to perform household/recreational activities.    Tami Johnson will be able to sleep through the night without waking due to pain in order to improve mental and physical well-being in order to safely participate in ADLs.     Tami Johnson will demonstrate independence,  excellent understanding of and report compliance with HEP to facilitate the learning process to maximize PT benefits and to facilitate independent rehab program upon discharge.    Time Calculation  Start Time: 1650  Stop Time: 1730  Time Calculation (min): 40 min     PT Therapeutic Procedures Time Entry  Manual Therapy Time Entry: 15  Therapeutic Exercise Time Entry: 25

## 2025-02-05 ENCOUNTER — APPOINTMENT (OUTPATIENT)
Dept: PHYSICAL THERAPY | Facility: CLINIC | Age: 55
End: 2025-02-05
Payer: COMMERCIAL

## 2025-02-06 ENCOUNTER — APPOINTMENT (OUTPATIENT)
Dept: PHYSICAL THERAPY | Facility: CLINIC | Age: 55
End: 2025-02-06
Payer: COMMERCIAL

## 2025-02-08 DIAGNOSIS — F41.9 ANXIETY DISORDER, UNSPECIFIED: ICD-10-CM

## 2025-02-09 ENCOUNTER — OFFICE VISIT (OUTPATIENT)
Dept: URGENT CARE | Age: 55
End: 2025-02-09
Payer: COMMERCIAL

## 2025-02-09 VITALS
BODY MASS INDEX: 40.17 KG/M2 | DIASTOLIC BLOOD PRESSURE: 89 MMHG | HEART RATE: 74 BPM | WEIGHT: 234 LBS | RESPIRATION RATE: 20 BRPM | SYSTOLIC BLOOD PRESSURE: 143 MMHG | TEMPERATURE: 98.1 F | OXYGEN SATURATION: 98 %

## 2025-02-09 DIAGNOSIS — J02.9 SORE THROAT: ICD-10-CM

## 2025-02-09 DIAGNOSIS — J22 ACUTE LOWER RESPIRATORY TRACT INFECTION: Primary | ICD-10-CM

## 2025-02-09 DIAGNOSIS — J02.9 PHARYNGITIS, UNSPECIFIED ETIOLOGY: ICD-10-CM

## 2025-02-09 LAB
POC RAPID INFLUENZA A: NEGATIVE
POC RAPID INFLUENZA B: NEGATIVE
POC RAPID STREP: NEGATIVE
POC SARS-COV-2 AG BINAX: NORMAL

## 2025-02-09 RX ORDER — IPRATROPIUM BROMIDE AND ALBUTEROL SULFATE 2.5; .5 MG/3ML; MG/3ML
3 SOLUTION RESPIRATORY (INHALATION) ONCE
Status: COMPLETED | OUTPATIENT
Start: 2025-02-09 | End: 2025-02-09

## 2025-02-09 RX ORDER — METHYLPREDNISOLONE SODIUM SUCCINATE 125 MG/2ML
125 INJECTION INTRAMUSCULAR; INTRAVENOUS ONCE
Status: COMPLETED | OUTPATIENT
Start: 2025-02-09 | End: 2025-02-09

## 2025-02-09 RX ORDER — ALBUTEROL SULFATE 90 UG/1
2 INHALANT RESPIRATORY (INHALATION) EVERY 4 HOURS PRN
Qty: 6.7 G | Refills: 0 | Status: SHIPPED | OUTPATIENT
Start: 2025-02-09 | End: 2026-02-09

## 2025-02-09 RX ORDER — BENZONATATE 200 MG/1
200 CAPSULE ORAL 3 TIMES DAILY PRN
Qty: 30 CAPSULE | Refills: 0 | Status: SHIPPED | OUTPATIENT
Start: 2025-02-09 | End: 2025-02-16

## 2025-02-09 RX ORDER — AZITHROMYCIN 250 MG/1
TABLET, FILM COATED ORAL
Qty: 6 TABLET | Refills: 0 | Status: SHIPPED | OUTPATIENT
Start: 2025-02-09 | End: 2025-02-14

## 2025-02-09 RX ORDER — METHYLPREDNISOLONE 4 MG/1
TABLET ORAL
Qty: 21 TABLET | Refills: 0 | Status: SHIPPED | OUTPATIENT
Start: 2025-02-09 | End: 2025-02-15

## 2025-02-09 RX ADMIN — METHYLPREDNISOLONE SODIUM SUCCINATE 125 MG: 125 INJECTION INTRAMUSCULAR; INTRAVENOUS at 15:14

## 2025-02-09 RX ADMIN — IPRATROPIUM BROMIDE AND ALBUTEROL SULFATE 3 ML: 2.5; .5 SOLUTION RESPIRATORY (INHALATION) at 15:13

## 2025-02-09 ASSESSMENT — ENCOUNTER SYMPTOMS
SHORTNESS OF BREATH: 1
FEVER: 1
COUGH: 1
SORE THROAT: 1
SINUS COMPLAINT: 1

## 2025-02-09 NOTE — PROGRESS NOTES
Subjective   Patient ID: Tami Johnson is a 54 y.o. female. They present today with a chief complaint of Sore Throat and Sinus Problem.    History of Present Illness  Patient has been ill for a week.  She has a productive cough, has had a sore throat, fevers chills.  She tells me she is concerned that she may have pneumonia.  She is also concerned about a sinus infection, tells me she has a headache and points to bilateral temples.      History provided by:  Patient   used: No    Sore Throat   Associated symptoms include congestion, coughing and shortness of breath.   Sinus Problem  Associated symptoms: congestion, cough, fever, shortness of breath and sore throat        Past Medical History  Allergies as of 02/09/2025 - Reviewed 02/09/2025   Allergen Reaction Noted    Penicillins Hives, Itching, and Nausea/vomiting 09/12/2014       (Not in a hospital admission)       Past Medical History:   Diagnosis Date    Abdominal pain 11/15/2023    Abnormal sensation in both ears 11/15/2023    Anxiety disorder, unspecified 12/21/2021    Anxiety    Atypical chest pain 11/15/2023    Cough 11/15/2023    Irregular menstruation, unspecified     Irregular menses    Laceration of thumb 11/15/2023    Neck muscle spasm 11/15/2023    Other microscopic hematuria     Microscopic hematuria    Personal history of other diseases of the digestive system 12/13/2014    History of irritable bowel syndrome    Personal history of other diseases of the digestive system     History of gastroesophageal reflux (GERD)    Personal history of other diseases of the nervous system and sense organs     History of otitis media    Personal history of other diseases of the respiratory system     History of pharyngitis    Personal history of other diseases of the respiratory system     History of sinusitis    Personal history of other endocrine, nutritional and metabolic disease     History of hyperlipidemia    Personal history of  other specified conditions     History of abdominal pain    Right cervical radiculopathy 11/15/2023    Right lower lobe pneumonia 11/15/2023    Shoulder pain, right 11/15/2023       Past Surgical History:   Procedure Laterality Date    CHOLECYSTECTOMY  01/16/2015    Cholecystectomy    HYSTERECTOMY  01/16/2015    Hysterectomy        reports that she has never smoked. She has never been exposed to tobacco smoke. She has never used smokeless tobacco. She reports that she does not currently use alcohol. She reports current drug use. Drug: Barbiturates.    Review of Systems  Review of Systems   Constitutional:  Positive for fever.   HENT:  Positive for congestion and sore throat.    Respiratory:  Positive for cough and shortness of breath.                                   Objective    Vitals:    02/09/25 1438   BP: 143/89   Pulse: 74   Resp: 20   Temp: 36.7 °C (98.1 °F)   SpO2: 98%   Weight: 106 kg (234 lb)     No LMP recorded. Patient has had a hysterectomy.    Physical Exam  Vitals and nursing note reviewed.   Constitutional:       Appearance: Normal appearance.      Comments: Alert oriented well-nourished well-developed 54-year-old female in no acute distress.  She is talkative and cooperative.   HENT:      Head: Normocephalic and atraumatic.      Right Ear: Tympanic membrane, ear canal and external ear normal.      Left Ear: Tympanic membrane, ear canal and external ear normal.      Ears:      Comments: Approximately 1.5 cm soft nontender noninflamed mass just anterior to the right tragus consistent with lipoma or sebaceous cyst     Nose: Nose normal. No congestion.      Mouth/Throat:      Mouth: Mucous membranes are moist.   Eyes:      Extraocular Movements: Extraocular movements intact.      Conjunctiva/sclera: Conjunctivae normal.      Pupils: Pupils are equal, round, and reactive to light.   Cardiovascular:      Rate and Rhythm: Normal rate and regular rhythm.   Pulmonary:      Effort: Pulmonary effort is  normal. No respiratory distress.      Breath sounds: No stridor. No wheezing, rhonchi or rales.      Comments: Breath sounds somewhat diminished bilaterally  Musculoskeletal:      Cervical back: Neck supple.   Neurological:      Mental Status: She is alert.         Procedures    Point of Care Test & Imaging Results from this visit  Results for orders placed or performed in visit on 02/09/25   POCT Covid-19 Rapid Antigen   Result Value Ref Range    POC EVELYN-COV-2 AG  Presumptive negative test for SARS-CoV-2 (no antigen detected)     Presumptive negative test for SARS-CoV-2 (no antigen detected)   POCT Influenza A/B manually resulted   Result Value Ref Range    POC Rapid Influenza A Negative Negative    POC Rapid Influenza B Negative Negative   POCT rapid strep A manually resulted   Result Value Ref Range    POC Rapid Strep Negative Negative      No results found.    Diagnostic study results (if any) were reviewed by Roxanne Perez PA-C.    Assessment/Plan   Allergies, medications, history, and pertinent labs/EKGs/Imaging reviewed by Roxanne Perez PA-C.     Medical Decision Making  COVID flu and strep testing were negative  History and physical examination are consistent with lower respiratory tract infection.  She voiced concern about the possibility of pneumonia.  We discussed treatment for pneumonia is the same as for the lower respiratory tract at this time.  If she has an outpatient treatment failure it would be an appropriate time to consider getting a chest x-ray.  (She was offered a chest x-ray but would have to go to [place] as we have no tech at the site today).  Will give her steroids and a DuoNeb treatment here.  Will discharge her on Zithromax, inhaler, Tessalon Perles, and a Medrol Dosepak.  If she feels worse instead of improving especially if she develops any significant or concerning symptoms she is encouraged to go to the emergency department for evaluation and treatment at a higher level of  care    Orders and Diagnoses  Diagnoses and all orders for this visit:  Sore throat  -     POCT Covid-19 Rapid Antigen  -     POCT Influenza A/B manually resulted  -     POCT rapid strep A manually resulted      Medical Admin Record      Patient disposition: Home    Electronically signed by Roxanne Perez PA-C  2:55 PM

## 2025-02-10 RX ORDER — SERTRALINE HYDROCHLORIDE 50 MG/1
50 TABLET, FILM COATED ORAL DAILY
Qty: 90 TABLET | Refills: 3 | Status: SHIPPED | OUTPATIENT
Start: 2025-02-10

## 2025-02-12 ENCOUNTER — TREATMENT (OUTPATIENT)
Dept: PHYSICAL THERAPY | Facility: CLINIC | Age: 55
End: 2025-02-12
Payer: COMMERCIAL

## 2025-02-12 DIAGNOSIS — M19.90 OSTEOARTHRITIS, UNSPECIFIED OSTEOARTHRITIS TYPE, UNSPECIFIED SITE: ICD-10-CM

## 2025-02-12 DIAGNOSIS — M19.012 OSTEOARTHRITIS OF BOTH SHOULDERS, UNSPECIFIED OSTEOARTHRITIS TYPE: Primary | ICD-10-CM

## 2025-02-12 DIAGNOSIS — M19.011 OSTEOARTHRITIS OF BOTH SHOULDERS, UNSPECIFIED OSTEOARTHRITIS TYPE: Primary | ICD-10-CM

## 2025-02-12 DIAGNOSIS — M19.90 OSTEOARTHRITIS: ICD-10-CM

## 2025-02-12 PROCEDURE — 97140 MANUAL THERAPY 1/> REGIONS: CPT | Mod: GP

## 2025-02-12 PROCEDURE — 97110 THERAPEUTIC EXERCISES: CPT | Mod: GP

## 2025-02-12 NOTE — PROGRESS NOTES
Physical Therapy    Physical Therapy Treatment      Patient Name: Tami Johnson  MRN: [id number]  Today's Date: 2/12/2025  Time Calculation  Start Time: 0527  Stop Time: 0605  Time Calculation (min): 38 min    Insurance - reviewed   Visit number: 6 (updated 02/12/25)   Payor: JAVI / Plan: JAVI  HEALTHCARE / Product Type: *No Product type* /    $15 co pay, no auth needed      Referring Provider: Ander Velazquez DO   Surgery: No surgery found, No surgery found.  Days since surgery: No surgery found       Assessment:  Pt tolerated tx well. No reports of pain or discomfort with any exercises on this date. Responds well to verbal and visual cues for form. Tightness and tenderness noted throughout B upper traps and rhomboids. Discussed importance of posture when at work and how this may help decrease some tightness in upper traps.     Plan:  Shoulder strengthening and ROM, modalities for pain         Current Problem:   Problem List Items Addressed This Visit             ICD-10-CM    Osteoarthritis - Primary M19.90    Relevant Orders    Follow Up In Physical Therapy    Follow Up In Physical Therapy           Subjective    Reports that she feels pretty good coming in today. States that she was very sore after last session and then had gotten sick so she missed an appt and took some time off of the exercises.       Precautions:  Fall Risk: None     Pain:  2/10  pain location: B shoulders (R>L)     Pain description: achy, sore       Objective Measures:   Objective     Posture: mild forward head, rounded shoulders       Treatments: * indicated new exercises for HEP    Therapeutic Exercise:  30 minutes    - shoulder pulley flexion x 3 minutes  - shoulder flexion wall slides 10 x 5 sec hold   - rows green TB 2 x 10   - shoulder extensions green TB 2 x 10  - shoulder IR/ER red TB x10 ea B  - B shoulder horizontal abduction 2 x 10, red TB  - standing shoulder scaption 2# 2x10*  - seated thoracic mobility x 10*  - scap squeezes  "x 10*  - s/l shoulder abduction 2x10 B 2#  - s/l ER 2 x 10 2#       NP-  - pec doorway stretch, low 3 x 30\"  - B shoulder ER 2 x 10, red TB  - ball up wall flexion stretch 20x 10\"  - cane AAROM to shoulder flexion, abd, ER x 10  - supine shoulder flexion 10x ea (only to 90 degrees)      Manual: 8 min   Seated stm of B UT, rhomboids, RTC  Trp of B UT's  PROM flexion, ER , abd R/L shoulder - NP   Grade II/III inferior / posterior GH mobilization R/L shoulder - NP   Skin intact following      Time Calculation  Start Time: 0527  Stop Time: 0605  Time Calculation (min): 38 min     PT Therapeutic Procedures Time Entry  Manual Therapy Time Entry: 8  Therapeutic Exercise Time Entry: 30                  "

## 2025-02-13 ENCOUNTER — APPOINTMENT (OUTPATIENT)
Dept: PHYSICAL THERAPY | Facility: CLINIC | Age: 55
End: 2025-02-13
Payer: COMMERCIAL

## 2025-02-19 ENCOUNTER — TREATMENT (OUTPATIENT)
Dept: PHYSICAL THERAPY | Facility: CLINIC | Age: 55
End: 2025-02-19
Payer: COMMERCIAL

## 2025-02-19 DIAGNOSIS — M19.90 OSTEOARTHRITIS: ICD-10-CM

## 2025-02-19 DIAGNOSIS — M19.90 OSTEOARTHRITIS, UNSPECIFIED OSTEOARTHRITIS TYPE, UNSPECIFIED SITE: ICD-10-CM

## 2025-02-19 PROCEDURE — 97110 THERAPEUTIC EXERCISES: CPT | Mod: GP,CQ

## 2025-02-19 PROCEDURE — 97140 MANUAL THERAPY 1/> REGIONS: CPT | Mod: GP,CQ

## 2025-02-19 NOTE — PROGRESS NOTES
Physical Therapy    Physical Therapy Treatment      Patient Name: Tami Johnson  MRN: [id number]  Today's Date: 2/19/2025  Time Calculation  Start Time: 0452  Stop Time: 0530  Time Calculation (min): 38 min    Insurance - reviewed   Visit number: 7 (updated 02/19/25)   Payor: JAVI / Plan: [employer]  HEALTHCARE / Product Type: *No Product type* /    $15 co pay, no auth needed      Referring Provider: Ander Velazquez DO   Surgery: No surgery found, No surgery found.  Days since surgery: No surgery found       Assessment:  Pt c/o L shoulder pain during L horizontal shoulder abduction and side lying shoulder abduction. Pt c/o shoulder fatigue following TB and side-lying ER, L > R shoulder. Pt displays tightness and trigger points throughout L UT, lower cervical region, and rhomboids. Pt refused cp following treatment session.     Plan:  Shoulder strengthening and ROM, modalities for pain         Current Problem:   Problem List Items Addressed This Visit             ICD-10-CM    Osteoarthritis M19.90       Subjective    Pt states that her L shoulder is bothering her more than her R shoulder recently. Pt states that her shoulder hurts randomly w/ different types of movements. Pt states that she has some shoulder discomfort when writing on the board.      Precautions:  Fall Risk: None     Pain:  2/10  pain location: B shoulders (R>L)     Pain description: achy, sore       Objective Measures:   Objective     Posture: mild forward head, rounded shoulders       Treatments: * indicated new exercises for HEP    Therapeutic Exercise:  28 minutes    - shoulder pulley flexion x 3 minutes  - shoulder flexion wall slides 10 x 5 sec hold   - bent over rows 2 x 10, 3#  - bent over shoulder extension 2 x 10, 3#   - rows green TB 2 x 10   - shoulder extensions green TB 2 x 10  - shoulder IR/ER red TB x10 ea B  - B shoulder horizontal abduction 2 x 10, red TB (L shoulder pain)  - standing shoulder scaption 2# 2x10  - supine cane flexion 2  "x 10  - seated thoracic mobility x 10  - scap squeezes x 10  - s/l shoulder abduction 2x10 B 2#  - s/l ER 2 x 10 2#     NP-  - pec doorway stretch, low 3 x 30\"  - B shoulder ER 2 x 10, red TB  - ball up wall flexion stretch 20x 10\"  - cane AAROM to shoulder flexion, abd, ER x 10  - supine shoulder flexion 10x ea (only to 90 degrees)      Manual: 10 min   Seated stm of L UT, rhomboids, RTC  Trp of L UT's, rhomboids  Skin intact following      Time Calculation  Start Time: 0452  Stop Time: 0530  Time Calculation (min): 38 min     PT Therapeutic Procedures Time Entry  Manual Therapy Time Entry: 10  Therapeutic Exercise Time Entry: 28                  "

## 2025-03-05 ENCOUNTER — TREATMENT (OUTPATIENT)
Dept: PHYSICAL THERAPY | Facility: CLINIC | Age: 55
End: 2025-03-05
Payer: COMMERCIAL

## 2025-03-05 DIAGNOSIS — M19.90 OSTEOARTHRITIS: ICD-10-CM

## 2025-03-05 DIAGNOSIS — M19.90 OSTEOARTHRITIS, UNSPECIFIED OSTEOARTHRITIS TYPE, UNSPECIFIED SITE: ICD-10-CM

## 2025-03-05 PROCEDURE — 97010 HOT OR COLD PACKS THERAPY: CPT | Mod: GP

## 2025-03-05 PROCEDURE — 97110 THERAPEUTIC EXERCISES: CPT | Mod: GP

## 2025-03-05 NOTE — PROGRESS NOTES
"Physical Therapy    Physical Therapy Treatment      Patient Name: Tami Johnson  MRN: [id number]  Today's Date: 3/5/2025  Time Calculation  Start Time: 0542  Stop Time: 0617  Time Calculation (min): 35 min    Insurance - reviewed   Visit number: 8 (updated 03/05/25)   Payor: JAVI / Plan: [employer]  HEALTHCARE / Product Type: *No Product type* /    $15 co pay, no auth needed      Referring Provider: Ander Velazquez DO   Surgery: No surgery found, No surgery found.  Days since surgery: No surgery found       Assessment:  Pt tolerated Tx fairly well. Some discomfort with open books at full range- able to tolerate mid range more. Tightness and tenderness noted in B upper traps on this date. Added UT and levator scap stretching for home.     Plan:  Shoulder strengthening and ROM, modalities for pain         Current Problem:   Problem List Items Addressed This Visit             ICD-10-CM    Osteoarthritis M19.90       Subjective    Pt states that she got a massage and this felt pretty good- was sore after but felt it helped loosen her up somewhat. Got a mammogram and they squished her shoulders which increased her pain and discomfort.       Precautions:  Fall Risk: None     Pain:  2/10  pain location: B shoulders (R>L)     Pain description: achy, sore       Objective Measures:   Objective     Posture: mild forward head, rounded shoulders       Treatments: * indicated new exercises for HEP    Therapeutic Exercise:  30 minutes    - shoulder pulley flexion x 3 minutes- NP   - shoulder flexion wall slides 2x10 B   - rows green TB 2 x 10   - shoulder extensions green TB 2 x 10  - shoulder flexion red TB 2x10*  - IR stretch with towel x 15 B  - seated thoracic mobility x 10  - UT stretch x 30s B*  - levator scap stretch x30s B*  - open book x10 B*    NP-  - pec doorway stretch, low 3 x 30\"  - B shoulder ER 2 x 10, red TB  - ball up wall flexion stretch 20x 10\"  - cane AAROM to shoulder flexion, abd, ER x 10  - supine shoulder " flexion 10x ea (only to 90 degrees)  - B shoulder horizontal abduction 2 x 10, red TB (L shoulder pain)  - bent over rows 2 x 10, 3#  - bent over shoulder extension 2 x 10, 3#   - shoulder IR/ER red TB x10 ea B  - standing shoulder scaption 2# 2x10  - supine cane flexion 2 x 10  - scap squeezes x 10  - s/l shoulder abduction 2x10 B 2#  - s/l ER 2 x 10 2#       Manual: NP  Seated stm of L UT, rhomboids, RTC  Trp of L UT's, rhomboids  Skin intact following    Modalities:  CP to B shoulders x 5 min       Time Calculation  Start Time: 0542  Stop Time: 0617  Time Calculation (min): 35 min     PT Therapeutic Procedures Time Entry  Therapeutic Exercise Time Entry: 30  PT Modalities Time Entry  Hot/Cold Pack Time Entry: 5

## 2025-03-12 ENCOUNTER — APPOINTMENT (OUTPATIENT)
Dept: PHYSICAL THERAPY | Facility: CLINIC | Age: 55
End: 2025-03-12
Payer: COMMERCIAL

## 2025-03-13 ENCOUNTER — TREATMENT (OUTPATIENT)
Dept: PHYSICAL THERAPY | Facility: CLINIC | Age: 55
End: 2025-03-13
Payer: COMMERCIAL

## 2025-03-13 DIAGNOSIS — M19.90 OSTEOARTHRITIS, UNSPECIFIED OSTEOARTHRITIS TYPE, UNSPECIFIED SITE: ICD-10-CM

## 2025-03-13 DIAGNOSIS — M19.90 OSTEOARTHRITIS: ICD-10-CM

## 2025-03-13 PROCEDURE — 97110 THERAPEUTIC EXERCISES: CPT | Mod: GP

## 2025-03-13 PROCEDURE — 97140 MANUAL THERAPY 1/> REGIONS: CPT | Mod: GP

## 2025-03-13 NOTE — PROGRESS NOTES
Physical Therapy    Physical Therapy Treatment      Patient Name: Tami Johnson  MRN: [id number]  Today's Date: 3/13/2025       Insurance - reviewed   Visit number: 9 (updated 03/14/25)   Payor: JAVI / Plan: JAVI  HEALTHCARE / Product Type: *No Product type* /    $15 co pay, no auth needed      Referring Provider: Ander Velazquez DO   Surgery: No surgery found, No surgery found.  Days since surgery: No surgery found       Assessment:  April has been seen for a total of 9 visits. She demonstrates improvements in her ROM, strength, flexibility, and overall function. She does have improvements in her pain, with taking less time to resolve when aggravated / being less intense. She does continue to present with ROM restrictions, flexibility, strength deficits, and pain. She is independent in a current HEP. At this time, I think she would benefit from continued therapy, with focus on ROM and flexibility as well as posture. Also recommend patient follow up with ortho due to continued significant shoulder pain. Patient continues to have difficulty with ability to complete         Plan:  1 x 4 weeks  Therapeutic exercise, Manual therapy, Gait training, Home program instruction and progression, Neuromuscular re-education, Therapeutic activities, and Self care and home management, modalities PRN      STG's (within 2 weeks)  Tami Johnson will decrease pain by >/= 2/10 points from baseline to improve ability to perform household/recreational activities. -MET     Tami Johnson will be able to sleep through the night with less than 2 interruptions due to pain in order to improve mental and physical well-being in order to safely participate in ADLs.  - MET     Tami Johnson will demonstrate independence,  excellent understanding of and report compliance with at least 3 exercises in her HEP to facilitate the learning process to maximize PT benefits and to facilitate independent rehab program upon discharge.  - MET     LTG's (by discharge)     Tami will increase B shoulder ROM to WFL to improve ability to perform household/recreational activities. - progressing towards     Tami will increase B shoulder strength to 5/5 to improve ability to perform household/recreational activities.   - progressing towards      Tami Alex will decrease pain to 0-2/10 to improve ability to perform household/recreational activities. - progressing towards     Tami Alex will be able to sleep through the night without waking due to pain in order to improve mental and physical well-being in order to safely participate in ADLs. - progressing towards     Tami Alex will demonstrate independence,  excellent understanding of and report compliance with HEP to facilitate the learning process to maximize PT benefits and to facilitate independent rehab program upon discharge. - progressing towards       Current Problem:   Problem List Items Addressed This Visit             ICD-10-CM    Osteoarthritis M19.90    Relevant Orders    Follow Up In Physical Therapy    Follow Up In Physical Therapy         Subjective    April reports that sometimes her shoulders feel better but sometimes she has more pain. She reports that reaching behind her back is better, but still having some pain on the left side. She has not been lifting anything heavy yet. She also has been avoiding painting / heavier household activities but she has been avoiding those activities.    Reports that she is consistently doing her exericses.       Precautions:  Fall Risk: None     Pain   At worst: 6-7/10  At Best: 0/10 - with resting   pain location: B shoulders (L>R)     Pain description: achy, sore       Objective Measures:   Objective     Posture: mild forward head, rounded shoulders         ROM: (WFL unless otherwise noted)   R shoulder flexion: 116  --> 130  L shoulder flexion: 150 --> 150  R shoulder abduction: 115 --> 140  L shoulder abduction:  "122 --> 125  R shoulder ER: C7 --> T1   75   L shoulder ER: C7 --> T1  60  R shoulder IR: S1 --> T 10   L shoulder IR: T12 --> T10     MMT:  R shoulder flexion: 4+, pain  --> 4+  pain   L shoulder flexion: 4, pain --> 4+ pain   R shoulder abduction: 4, pain  --> 4 pain   L shoulder abduction: 4+ --> 4+ pain   R shoulder ER: 4+ --> 4+   L shoulder ER: 4+ --> 4+   R shoulder IR: 4+ --> 4+  L shoulder IR: 4+ --> 4+      R elbow flexion: 5  L elbow flexion: 5  R elbow extension: 4+, pain --> 5   L elbow extension: 4+, pain  --> 5        Special Tests: empty can (+) on R/L, hernadez chu (-) on R, hernadez chu (-) on L      Tenderness: B upper trap, B glenohumeral joint line posterior/lateral       Treatments: * indicated new exercises for HEP    Therapeutic Exercise:  30 minutes    - shoulder pulley flexion x 3 minutes- NP   - shoulder flexion wall slides 2x10 B   - rows green TB 2 x 10   - shoulder extensions green TB 2 x 10  - shoulder flexion red TB 2x10*  - IR stretch with towel x 15 B  - seated thoracic mobility x 10  - UT stretch x 30s B*  - levator scap stretch x30s B*  - open book x10 B*  - reassessment time    NP-  - pec doorway stretch, low 3 x 30\"  - B shoulder ER 2 x 10, red TB  - ball up wall flexion stretch 20x 10\"  - cane AAROM to shoulder flexion, abd, ER x 10  - supine shoulder flexion 10x ea (only to 90 degrees)  - B shoulder horizontal abduction 2 x 10, red TB (L shoulder pain)  - bent over rows 2 x 10, 3#  - bent over shoulder extension 2 x 10, 3#   - shoulder IR/ER red TB x10 ea B  - standing shoulder scaption 2# 2x10  - supine cane flexion 2 x 10  - scap squeezes x 10  - s/l shoulder abduction 2x10 B 2#  - s/l ER 2 x 10 2#       Manual: 10 minutes , 1 unit  Supine Grade III/IV inferior / posterior joint moiblization, GH distractions bilateral  PROM flexion, Abd, ER    Modalities:        Time Calculation  Start Time: 1735  Stop Time: 1815  Time Calculation (min): 40 min     PT Therapeutic " Procedures Time Entry  Manual Therapy Time Entry: 10  Therapeutic Exercise Time Entry: 30

## 2025-03-19 ENCOUNTER — TREATMENT (OUTPATIENT)
Dept: PHYSICAL THERAPY | Facility: CLINIC | Age: 55
End: 2025-03-19
Payer: COMMERCIAL

## 2025-03-19 DIAGNOSIS — M19.90 OSTEOARTHRITIS, UNSPECIFIED OSTEOARTHRITIS TYPE, UNSPECIFIED SITE: ICD-10-CM

## 2025-03-19 DIAGNOSIS — M19.90 OSTEOARTHRITIS: ICD-10-CM

## 2025-03-19 PROCEDURE — 97110 THERAPEUTIC EXERCISES: CPT | Mod: GP,CQ

## 2025-03-19 PROCEDURE — 97140 MANUAL THERAPY 1/> REGIONS: CPT | Mod: GP,CQ

## 2025-03-19 NOTE — PROGRESS NOTES
Physical Therapy    Physical Therapy Treatment      Patient Name: Tami Johnson  MRN: [id number]  Today's Date: 3/19/2025  Time Calculation  Start Time: 0450  Stop Time: 0530  Time Calculation (min): 40 min    Insurance - reviewed   Visit number: 10 (updated 03/19/25)   Payor: JAVI / Plan: [employer]  HEALTHCARE / Product Type: *No Product type* /    $15 co pay, no auth needed      Referring Provider: Ander Velazquez DO   Surgery: No surgery found, No surgery found.  Days since surgery: No surgery found       Assessment:  Visit focused on increased shoulder rom. Pt challenged w/ shoulder IR stretch d/t lack of shoulder mobility. Pt c/o L shoulder pain during end range prom shoulder flexion, but R shoulder more tolerant to prom. Cp applied following treatment session to decreased shoulder soreness.     Plan:  Stretching and rom exercise to improve shoulder mobility.       STG's (within 2 weeks)  Tami Johnson will decrease pain by >/= 2/10 points from baseline to improve ability to perform household/recreational activities. -MET     Tami Johnson will be able to sleep through the night with less than 2 interruptions due to pain in order to improve mental and physical well-being in order to safely participate in ADLs.  - MET     Tami Johnson will demonstrate independence,  excellent understanding of and report compliance with at least 3 exercises in her HEP to facilitate the learning process to maximize PT benefits and to facilitate independent rehab program upon discharge. - MET     LTG's (by discharge)     Tami will increase B shoulder ROM to WFL to improve ability to perform household/recreational activities. - progressing towards     Tami will increase B shoulder strength to 5/5 to improve ability to perform household/recreational activities.   - progressing towards      Tami Alex will decrease pain to 0-2/10 to improve ability to perform household/recreational activities. -  "progressing towards     Tami Johnson will be able to sleep through the night without waking due to pain in order to improve mental and physical well-being in order to safely participate in ADLs. - progressing towards     Tami Johnson will demonstrate independence,  excellent understanding of and report compliance with HEP to facilitate the learning process to maximize PT benefits and to facilitate independent rehab program upon discharge. - progressing towards       Current Problem:   Problem List Items Addressed This Visit             ICD-10-CM    Osteoarthritis M19.90         Subjective    Pt states that her L shoulder is worse than R shoulder, but pain is not bad today.     Reports that she is consistently doing her exericses.       Precautions:  Fall Risk: None     Pain   At worst: 6-7/10  At Best: 0/10 - with resting   pain location: B shoulders (L>R)     Pain description: achy, sore       Objective Measures:   Objective     Posture: mild forward head, rounded shoulders     Treatments: * indicated new exercises for HEP    Therapeutic Exercise:  25 minutes    - shoulder pulley flexion x 5 minutes  - shoulder flexion wall slides 2x10 B   - IR stretch with towel x 15 B   - standing cane shoulder abduction stretch 10 x 5 sec hold  - cane AAROM to shoulder flexion x 10  - seated thoracic mobility x 10  - UT stretch x 30s B  - levator scap stretch x30s B    NP-  - pec doorway stretch, low 3 x 30\"  - B shoulder ER 2 x 10, red TB  - ball up wall flexion stretch 20x 10\"  - supine shoulder flexion 10x ea (only to 90 degrees)  - B shoulder horizontal abduction 2 x 10, red TB (L shoulder pain)  - bent over rows 2 x 10, 3#  - bent over shoulder extension 2 x 10, 3#   - shoulder IR/ER red TB x10 ea B  - standing shoulder scaption 2# 2x10  - scap squeezes x 10  - s/l shoulder abduction 2x10 B 2#  - s/l ER 2 x 10 2#       Manual: 15 minutes , 1 unit  Supine Grade III/IV inferior / posterior joint moiblization, GH " distractions bilateral  PROM flexion, Abd, ER    Modalities:   Cp x 4 minutes, B shoulders, seated        Time Calculation  Start Time: 0450  Stop Time: 0530  Time Calculation (min): 40 min     PT Therapeutic Procedures Time Entry  Manual Therapy Time Entry: 15  Therapeutic Exercise Time Entry: 25

## 2025-04-03 DIAGNOSIS — K21.9 GASTROESOPHAGEAL REFLUX DISEASE WITHOUT ESOPHAGITIS: ICD-10-CM

## 2025-04-03 RX ORDER — PANTOPRAZOLE SODIUM 40 MG/1
TABLET, DELAYED RELEASE ORAL
Qty: 180 TABLET | Refills: 1 | Status: SHIPPED | OUTPATIENT
Start: 2025-04-03

## 2025-04-09 ENCOUNTER — TREATMENT (OUTPATIENT)
Dept: PHYSICAL THERAPY | Facility: CLINIC | Age: 55
End: 2025-04-09
Payer: COMMERCIAL

## 2025-04-09 DIAGNOSIS — M19.012 OSTEOARTHRITIS OF BOTH SHOULDERS, UNSPECIFIED OSTEOARTHRITIS TYPE: Primary | ICD-10-CM

## 2025-04-09 DIAGNOSIS — M19.90 OSTEOARTHRITIS, UNSPECIFIED OSTEOARTHRITIS TYPE, UNSPECIFIED SITE: ICD-10-CM

## 2025-04-09 DIAGNOSIS — M19.011 OSTEOARTHRITIS OF BOTH SHOULDERS, UNSPECIFIED OSTEOARTHRITIS TYPE: Primary | ICD-10-CM

## 2025-04-09 DIAGNOSIS — M19.90 OSTEOARTHRITIS: ICD-10-CM

## 2025-04-09 PROCEDURE — 97110 THERAPEUTIC EXERCISES: CPT | Mod: GP,CQ

## 2025-04-09 PROCEDURE — 97140 MANUAL THERAPY 1/> REGIONS: CPT | Mod: GP,CQ

## 2025-04-09 NOTE — PROGRESS NOTES
Physical Therapy    Physical Therapy Treatment      Patient Name: Tami Johnson  MRN: [id number]  Today's Date: 4/9/2025  Time Calculation  Start Time: 0530  Stop Time: 0620  Time Calculation (min): 50 min    Insurance - reviewed   Visit number: 11 (updated 04/09/25)   Payor: JAVI / Plan: [employer]  HEALTHCARE / Product Type: *No Product type* /    $15 co pay, no auth needed      Referring Provider: Ander Velazquez DO   Surgery: No surgery found, No surgery found.  Days since surgery: No surgery found       Assessment:  Pt challenged w/ supine shoulder flexion and side lying shoulder abduction d/t fatigue and weakness. Pt displays tightness and trigger points of L UT and RTC region. Pt c/o muscle soreness, but no c/o increased pain following treatment session.     Plan:  Stretching and rom exercise to improve shoulder mobility.       STG's (within 2 weeks)  Tami Johnson will decrease pain by >/= 2/10 points from baseline to improve ability to perform household/recreational activities. -MET     Tami Johnson will be able to sleep through the night with less than 2 interruptions due to pain in order to improve mental and physical well-being in order to safely participate in ADLs.  - MET     Tami Johnson will demonstrate independence,  excellent understanding of and report compliance with at least 3 exercises in her HEP to facilitate the learning process to maximize PT benefits and to facilitate independent rehab program upon discharge. - MET     LTG's (by discharge)     Tami will increase B shoulder ROM to WFL to improve ability to perform household/recreational activities. - progressing towards     Tami will increase B shoulder strength to 5/5 to improve ability to perform household/recreational activities.   - progressing towards      Tami Alex will decrease pain to 0-2/10 to improve ability to perform household/recreational activities. - progressing towards     Tami  "Alex will be able to sleep through the night without waking due to pain in order to improve mental and physical well-being in order to safely participate in ADLs. - progressing towards     Tami Johnson will demonstrate independence,  excellent understanding of and report compliance with HEP to facilitate the learning process to maximize PT benefits and to facilitate independent rehab program upon discharge. - progressing towards       Current Problem:   Problem List Items Addressed This Visit             ICD-10-CM    Osteoarthritis - Primary M19.90           Subjective    Pt states that her L shoulder is giving her the most trouble. Pt reports 1/10 L shoulder pain.   HEP: compliant    Precautions:  Fall Risk: None     Pain   At worst: 6-7/10  At Best: 0/10 - with resting   pain location: B shoulders (L>R)     Pain description: achy, sore       Objective Measures:   Objective     Posture: mild forward head, rounded shoulders     Treatments: * indicated new exercises for HEP    Therapeutic Exercise:  40 minutes    - shoulder pulley flexion x 5 minutes  - shoulder flexion wall slides 10 x 5 sec hold   - UT stretch x 30s B  - bent over rows 2 x 10, 3#  - shoulder extension 2 x 10, green TB  - ball on wall circles 20x ea, cw, ccw  - shoulder IR/ER red TB x10 ea B  - supine shoulder flexion 2 x 10 ea (only to 90 degrees), 1#  - s/l shoulder abduction 2x10 B 1#  - s/l ER 2 x 10 2#   - supine serratus punches 2 x 10, 2#    NP-  - pec doorway stretch, low 3 x 30\"  - B shoulder ER 2 x 10, red TB  - ball up wall flexion stretch 20x 10\"  - B shoulder horizontal abduction 2 x 10, red TB (L shoulder pain)  - bent over shoulder extension 2 x 10, 3#   - standing shoulder scaption 2# 2x10  - scap squeezes x 10  - IR stretch with towel x 15 B   - standing cane shoulder abduction stretch 10 x 5 sec hold  - cane AAROM to shoulder flexion x 10  - seated thoracic mobility x 10  - levator scap stretch x30s B    Manual: 10 " minutes  Seated stm of L UT, levator, rtc, pecs, lats  Tpr of L UT, levator and lat region  Skin intact following    Modalities:   Cp x 4 minutes, B shoulders, seated        Time Calculation  Start Time: 0530  Stop Time: 0620  Time Calculation (min): 50 min     PT Therapeutic Procedures Time Entry  Manual Therapy Time Entry: 10  Therapeutic Exercise Time Entry: 40

## 2025-04-16 ENCOUNTER — TREATMENT (OUTPATIENT)
Dept: PHYSICAL THERAPY | Facility: CLINIC | Age: 55
End: 2025-04-16
Payer: COMMERCIAL

## 2025-04-16 DIAGNOSIS — M19.90 OSTEOARTHRITIS: ICD-10-CM

## 2025-04-16 DIAGNOSIS — M19.90 OSTEOARTHRITIS, UNSPECIFIED OSTEOARTHRITIS TYPE, UNSPECIFIED SITE: ICD-10-CM

## 2025-04-16 PROCEDURE — 97110 THERAPEUTIC EXERCISES: CPT | Mod: GP,CQ

## 2025-04-16 PROCEDURE — 97140 MANUAL THERAPY 1/> REGIONS: CPT | Mod: GP,CQ

## 2025-04-16 NOTE — PROGRESS NOTES
Physical Therapy    Physical Therapy Treatment      Patient Name: Tami Johnson  MRN: [id number]  Today's Date: 4/16/2025  Time Calculation  Start Time: 0535  Stop Time: 0620  Time Calculation (min): 45 min    Insurance - reviewed   Visit number: 12 (updated 04/16/25)   Payor: JAVI / Plan: [employer]  HEALTHCARE / Product Type: *No Product type* /    $15 co pay, no auth needed      Referring Provider: Ander Velazquez DO   Surgery: No surgery found, No surgery found.  Days since surgery: No surgery found       Assessment:  Pt c/o L shoulder fatigue during standing scaption and abduction. Pt displays tightness of B UT's, rtc, and lower cervical region. Pt able to complete all exercises w/o increased complaints of shoulder pain, but complains of muscle fatigue following treatment session.     Plan:  Continue to progress exercises to improve strength and endurance.       STG's (within 2 weeks)  Tami Johnson will decrease pain by >/= 2/10 points from baseline to improve ability to perform household/recreational activities. -MET     Tami Johnson will be able to sleep through the night with less than 2 interruptions due to pain in order to improve mental and physical well-being in order to safely participate in ADLs.  - MET     Tami Johnson will demonstrate independence,  excellent understanding of and report compliance with at least 3 exercises in her HEP to facilitate the learning process to maximize PT benefits and to facilitate independent rehab program upon discharge. - MET     LTG's (by discharge)     Tami will increase B shoulder ROM to WFL to improve ability to perform household/recreational activities. - progressing towards     Tami will increase B shoulder strength to 5/5 to improve ability to perform household/recreational activities.   - progressing towards      Tami Alex will decrease pain to 0-2/10 to improve ability to perform household/recreational activities. -  "progressing towards     [facility] will be able to sleep through the night without waking due to pain in order to improve mental and physical well-being in order to safely participate in ADLs. - progressing towards     [facility] will demonstrate independence,  excellent understanding of and report compliance with HEP to facilitate the learning process to maximize PT benefits and to facilitate independent rehab program upon discharge. - progressing towards       Current Problem:   Problem List Items Addressed This Visit           ICD-10-CM    Osteoarthritis M19.90           Subjective    Pt reports B shoulder pain, but L shoulder > R shoulder.    HEP: compliant    Precautions:  Fall Risk: None     Pain   At worst: 6-7/10  At Best: 2/10 - with resting   pain location: B shoulders (L>R)     Pain description: achy, sore       Objective Measures:   Objective     Posture: mild forward head, rounded shoulders     Treatments: * indicated new exercises for HEP    Therapeutic Exercise:  25 minutes    - shoulder pulley flexion x 5 minutes  - shoulder flexion wall slides 10 x 5 sec hold   - UT stretch 2 x 30s B  - bent over rows 2 x 10, 3#  - bent over shoulder extension 2 x 10, 2#  - B shoulder ER 2 x 10, red TB  - B horizontal abduction 2 x 10, red TB  - standing shoulder flexion to 90 degrees 2 x 10, 1#  - standing shoulder scaption to 90 degrees 2 x 10, 0#  - standing shoulder abduction to 90 degress 2 x 10, 0#  - shoulder extension 2 x 10, green TB  - shoulder ER red TB 2 x 10 ea  - shoulder IR green TB 2 x 10 ea      NP-  - pec doorway stretch, low 3 x 30\"  - B shoulder ER 2 x 10, red TB  - ball up wall flexion stretch 20x 10\"  - B shoulder horizontal abduction 2 x 10, red TB (L shoulder pain)  - standing shoulder scaption 2# 2x10  - scap squeezes x 10  - IR stretch with towel x 15 B   - standing cane shoulder abduction stretch 10 x 5 sec hold  - cane AAROM to shoulder flexion x 10  - seated thoracic " mobility x 10  - levator scap stretch x30s B  - supine shoulder flexion 2 x 10 ea (only to 90 degrees), 1#  - s/l shoulder abduction 2x10 B   - s/l ER 2 x 10 2#   - supine serratus punches 2 x 10, 2#    Manual: 15 minutes  Seated stm of L UT, levator, rtc, pecs, lats  Tpr of L UT, levator and lat region  Skin intact following    Modalities:   Cp x 5 minutes, seated, neck and L shoulder  Skin intact following      Time Calculation  Start Time: 0535  Stop Time: 0620  Time Calculation (min): 45 min     PT Therapeutic Procedures Time Entry  Manual Therapy Time Entry: 15  Therapeutic Exercise Time Entry: 25  PT Modalities Time Entry  Hot/Cold Pack Time Entry: 5

## 2025-04-23 ENCOUNTER — DOCUMENTATION (OUTPATIENT)
Dept: PHYSICAL THERAPY | Facility: CLINIC | Age: 55
End: 2025-04-23
Payer: COMMERCIAL

## 2025-04-23 NOTE — PROGRESS NOTES
Physical Therapy                 Therapy Communication Note    Patient Name: Tami Johnson  MRN: [id number]  Department:   Room: Room/bed info not found  Today's Date: 4/23/2025     Discipline: Physical Therapy        Missed Visit Reason:  No call, no show.    Missed Time: No Show    
Home

## 2025-05-01 ENCOUNTER — TREATMENT (OUTPATIENT)
Dept: PHYSICAL THERAPY | Facility: CLINIC | Age: 55
End: 2025-05-01
Payer: COMMERCIAL

## 2025-05-01 DIAGNOSIS — M19.90 OSTEOARTHRITIS, UNSPECIFIED OSTEOARTHRITIS TYPE, UNSPECIFIED SITE: ICD-10-CM

## 2025-05-01 DIAGNOSIS — M19.90 OSTEOARTHRITIS: ICD-10-CM

## 2025-05-01 PROCEDURE — 97110 THERAPEUTIC EXERCISES: CPT | Mod: GP

## 2025-05-01 PROCEDURE — 97140 MANUAL THERAPY 1/> REGIONS: CPT | Mod: GP

## 2025-05-01 NOTE — PROGRESS NOTES
Physical Therapy    Physical Therapy Reassessment and Treatment and Discharge       Patient Name: Tami Johnson  MRN: [id number]  Today's Date: 5/1/2025  Time Calculation  Start Time: 1645  Stop Time: 1730  Time Calculation (min): 45 min    Insurance - reviewed   Visit number: 13 (updated 05/01/25)   Payor: JAVI / Plan: [employer]  HEALTHCARE / Product Type: *No Product type* /    $15 co pay, no auth needed      Referring Provider: Ander Velazquez DO   Surgery: No surgery found, No surgery found.  Days since surgery: No surgery found       Assessment: April has been seen for a total of 12 visits. Demonstrates improvements in shoulder ROM, strength, flexibility, and function on right. Left shoulder continues to demonstrates decreased ROM, strength, and pain. She continues to have a catching and popping sensation in her right shoulder. She is independent in her home program. At this time, discharge to [facility]. Patient is going to follow up with orthopedic doctor regarding continued symptoms.     Plan:  Continue to progress exercises to improve strength and endurance.       STG's (within 2 weeks)  Tami Johnson will decrease pain by >/= 2/10 points from baseline to improve ability to perform household/recreational activities. -MET     Tami Johnson will be able to sleep through the night with less than 2 interruptions due to pain in order to improve mental and physical well-being in order to safely participate in ADLs.  - MET     Tami Johnson will demonstrate independence,  excellent understanding of and report compliance with at least 3 exercises in her HEP to facilitate the learning process to maximize PT benefits and to facilitate independent rehab program upon discharge. - MET     LTG's (by discharge)     Tami will increase B shoulder ROM to WFL to improve ability to perform household/recreational activities. - partially met     Tami will increase B shoulder strength to 5/5 to improve ability  to perform household/recreational activities.   - partially met     Tami Johnson will decrease pain to 0-2/10 to improve ability to perform household/recreational activities. - partially met      Tami Johnson will be able to sleep through the night without waking due to pain in order to improve mental and physical well-being in order to safely participate in ADLs. - progressing towards     Tami Johnson will demonstrate independence,  excellent understanding of and report compliance with HEP to facilitate the learning process to maximize PT benefits and to facilitate independent rehab program upon discharge. - met       Current Problem:   Problem List Items Addressed This Visit           ICD-10-CM    Osteoarthritis M19.90           Subjective    Patient reports that overall her right shoulder is feeling better. She thinks her left one is feeling worse. Her right side does still hurt, but when she has pain it is not as severe and does not last as long.   HEP: compliant    Precautions:  Fall Risk: None     Pain   At worst: R: 2/10 L: 1-2 , sometimes will shoot higher but is temporary  At Best: 0/10 - with resting   pain location: B shoulders (L>R)     Pain description: achy, sore       Objective Measures:   Objective     Posture: mild forward head, rounded shoulders         ROM: (WFL unless otherwise noted)    R shoulder flexion: 116  --> 130  --> 160  L shoulder flexion: 150 --> 150 --> 150  R shoulder abduction: 115 --> 140 --> 150  L shoulder abduction: 122 --> 125 --> 130  R shoulder ER: C7 --> T1   75 -->  T1  L shoulder ER: C7 --> T1  60 --> C7  R shoulder IR: S1 --> T 10 --> T10   L shoulder IR: T12 --> T10 --> T12      MMT:  R shoulder flexion: 4+, pain  --> 4+  pain   L shoulder flexion: 4, pain --> 4+ pain   R shoulder abduction: 4, pain  --> 4 pain   L shoulder abduction: 4+ --> 4+ pain   R shoulder ER: 4+ --> 4+   L shoulder ER: 4+ --> 4+   R shoulder IR: 4+ --> 4+  L shoulder IR: 4+  "--> 4+      R elbow flexion: 5  L elbow flexion: 5  R elbow extension: 4+, pain --> 5   L elbow extension: 4+, pain  --> 5        Special Tests: empty can (+) on R/L, maricarmen chu (-) on R, maricarmen sage (-) on L      Tenderness: B upper trap, B glenohumeral joint line posterior/lateral     Treatments: * indicated new exercises for HEP    Therapeutic Exercise:  15 minutes    - reassessment time; review of home program       NP-  - pec doorway stretch, low 3 x 30\"  - B shoulder ER 2 x 10, red TB  - ball up wall flexion stretch 20x 10\"  - B shoulder horizontal abduction 2 x 10, red TB (L shoulder pain)  - standing shoulder scaption 2# 2x10  - scap squeezes x 10  - IR stretch with towel x 15 B   - standing cane shoulder abduction stretch 10 x 5 sec hold  - cane AAROM to shoulder flexion x 10  - seated thoracic mobility x 10  - levator scap stretch x30s B  - supine shoulder flexion 2 x 10 ea (only to 90 degrees), 1#  - s/l shoulder abduction 2x10 B   - s/l ER 2 x 10 2#   - supine serratus punches 2 x 10, 2#    Manual: 30 minutes  Seated stm of L UT, levator, rtc, pecs, lats  Tpr of L UT, levator and lat region  Skin intact following    Modalities:   Cp x 5 minutes, seated, neck and L shoulder  Skin intact following      Time Calculation  Start Time: 1645  Stop Time: 1730  Time Calculation (min): 45 min     PT Therapeutic Procedures Time Entry  Manual Therapy Time Entry: 30  Therapeutic Exercise Time Entry: 15                  "

## 2025-05-03 ENCOUNTER — OFFICE VISIT (OUTPATIENT)
Dept: URGENT CARE | Age: 55
End: 2025-05-03
Payer: COMMERCIAL

## 2025-05-03 VITALS
TEMPERATURE: 98.1 F | BODY MASS INDEX: 41.5 KG/M2 | WEIGHT: 241.8 LBS | HEART RATE: 79 BPM | DIASTOLIC BLOOD PRESSURE: 97 MMHG | SYSTOLIC BLOOD PRESSURE: 150 MMHG | OXYGEN SATURATION: 97 %

## 2025-05-03 DIAGNOSIS — Z20.822 SUSPECTED COVID-19 VIRUS INFECTION: ICD-10-CM

## 2025-05-03 DIAGNOSIS — J22 LOWER RESPIRATORY TRACT INFECTION: ICD-10-CM

## 2025-05-03 DIAGNOSIS — R05.1 ACUTE COUGH: Primary | ICD-10-CM

## 2025-05-03 LAB
POC CORONAVIRUS SARS-COV-2 PCR: NEGATIVE
POC HUMAN RHINOVIRUS PCR: NEGATIVE
POC INFLUENZA A VIRUS PCR: NEGATIVE
POC INFLUENZA B VIRUS PCR: NEGATIVE
POC RESPIRATORY SYNCYTIAL VIRUS PCR: NEGATIVE

## 2025-05-03 RX ORDER — BENZONATATE 200 MG/1
200 CAPSULE ORAL 3 TIMES DAILY PRN
Qty: 30 CAPSULE | Refills: 0 | Status: SHIPPED | OUTPATIENT
Start: 2025-05-03 | End: 2025-05-13

## 2025-05-03 RX ORDER — AZITHROMYCIN 250 MG/1
TABLET, FILM COATED ORAL
Qty: 6 TABLET | Refills: 0 | Status: SHIPPED | OUTPATIENT
Start: 2025-05-03 | End: 2025-05-03

## 2025-05-03 RX ORDER — AZITHROMYCIN 250 MG/1
TABLET, FILM COATED ORAL
Qty: 6 TABLET | Refills: 0 | Status: SHIPPED | OUTPATIENT
Start: 2025-05-03

## 2025-05-03 RX ORDER — BENZONATATE 200 MG/1
200 CAPSULE ORAL 3 TIMES DAILY PRN
Qty: 30 CAPSULE | Refills: 0 | Status: SHIPPED | OUTPATIENT
Start: 2025-05-03 | End: 2025-05-03

## 2025-05-03 NOTE — PROGRESS NOTES
Subjective   Patient ID: Tami Johnson is a 54 y.o. female. They present today with a chief complaint of Illness (Cough, runnynose, sorethroat, x1wk).      Past Medical History  Allergies as of 05/03/2025 - Reviewed 05/03/2025   Allergen Reaction Noted    Penicillins Hives, Itching, and Nausea/vomiting 09/12/2014       Prescriptions Prior to Admission[1]     Medical History[2]    Surgical History[3]     reports that she has never smoked. She has never been exposed to tobacco smoke. She has never used smokeless tobacco. She reports that she does not currently use alcohol. She reports current drug use. Drug: Barbiturates.    Review of Systems  Review of Systems                               Objective    Vitals:    05/03/25 1454   BP: (!) 150/97   Pulse: 79   Temp: 36.7 °C (98.1 °F)   SpO2: 97%   Weight: 110 kg (241 lb 12.8 oz)     No LMP recorded. Patient has had a hysterectomy.    Physical Exam  Vitals reviewed.   Constitutional:       General: She is not in acute distress.  HENT:      Head: Normocephalic and atraumatic.      Right Ear: Tympanic membrane and ear canal normal. No tenderness.      Left Ear: Tympanic membrane and ear canal normal. No tenderness.      Nose: Congestion present.      Mouth/Throat:      Mouth: Mucous membranes are moist.      Pharynx: Oropharynx is clear. Uvula midline. No pharyngeal swelling, oropharyngeal exudate or posterior oropharyngeal erythema.   Eyes:      Extraocular Movements: Extraocular movements intact.      Conjunctiva/sclera: Conjunctivae normal.      Pupils: Pupils are equal, round, and reactive to light.   Cardiovascular:      Rate and Rhythm: Normal rate and regular rhythm.      Heart sounds: No murmur heard.  Pulmonary:      Effort: Pulmonary effort is normal.      Breath sounds: Normal breath sounds. No decreased breath sounds, wheezing, rhonchi or rales.   Skin:     General: Skin is warm.   Neurological:      Mental Status: She is alert and oriented to person,  place, and time.   Psychiatric:         Mood and Affect: Mood normal.         Behavior: Behavior normal.             Point of Care Test & Imaging Results from this visit  Results for orders placed or performed in visit on 05/03/25   POCT SPOTFIRE R/ST Panel Mini w/COVID ([facility]) manually resulted    Specimen: Swab   Result Value Ref Range    POC Sars-Cov-2 PCR Negative Negative    POC Respiratory Syncytial Virus PCR Negative Negative    POC Influenza A Virus PCR Negative Negative    POC Influenza B Virus PCR Negative Negative    POC Human Rhinovirus PCR Negative Negative      Imaging  No results found.    Cardiology, Vascular, and Other Imaging  No other imaging results found for the past 2 days      Diagnostic study results (if any) were reviewed by Leatha Meyer PA-C.    Assessment/Plan   Allergies, medications, history, and pertinent labs/EKGs/Imaging reviewed by Leatha Meyer PA-C.     Medical Decision Making      Orders and Diagnoses  Diagnoses and all orders for this visit:  Acute cough  -     benzonatate (Tessalon) 200 mg capsule; Take 1 capsule (200 mg) by mouth 3 times a day as needed for cough for up to 10 days. Do not crush or chew.  Suspected COVID-19 virus infection  -     POCT SPOTFIRE R/ST Panel Mini w/COVID ([facility]) manually resulted  Lower respiratory tract infection  -     azithromycin (Zithromax) 250 mg tablet; Take 2 tabs (500 mg) by mouth today, then take 1 tab daily for 4 days.      Medical Admin Record  neg covid, flu, RSV, and rhinovius. Will start on benzonatate and azithromycin.  -         Patient is educated about their diagnoses.     -          Discussed medications benefits and adverse effects.     -          Answered all patient’s questions.     -          Patient will call 911 or go to the nearest ED if worsen symptoms .     -          Patient is agreeable to the plan of care and is deemed stable upon discharge.     -          Follow up with your primary care provider in two  days.      Patient disposition: Home    Electronically signed by Leatha Meyer PA-C  3:40 PM           [1] (Not in a hospital admission)   [2]   Past Medical History:  Diagnosis Date    Abdominal pain 11/15/2023    Abnormal sensation in both ears 11/15/2023    Anxiety disorder, unspecified 12/21/2021    Anxiety    Atypical chest pain 11/15/2023    Cough 11/15/2023    Irregular menstruation, unspecified     Irregular menses    Laceration of thumb 11/15/2023    Neck muscle spasm 11/15/2023    Other microscopic hematuria     Microscopic hematuria    Personal history of other diseases of the digestive system 12/13/2014    History of irritable bowel syndrome    Personal history of other diseases of the digestive system     History of gastroesophageal reflux (GERD)    Personal history of other diseases of the nervous system and sense organs     History of otitis media    Personal history of other diseases of the respiratory system     History of pharyngitis    Personal history of other diseases of the respiratory system     History of sinusitis    Personal history of other endocrine, nutritional and metabolic disease     History of hyperlipidemia    Personal history of other specified conditions     History of abdominal pain    Right cervical radiculopathy 11/15/2023    Right lower lobe pneumonia 11/15/2023    Shoulder pain, right 11/15/2023   [3]   Past Surgical History:  Procedure Laterality Date    CHOLECYSTECTOMY  01/16/2015    Cholecystectomy    HYSTERECTOMY  01/16/2015    Hysterectomy

## 2025-06-23 ENCOUNTER — OFFICE VISIT (OUTPATIENT)
Dept: ORTHOPEDIC SURGERY | Facility: HOSPITAL | Age: 55
End: 2025-06-23
Payer: COMMERCIAL

## 2025-06-23 DIAGNOSIS — S46.011S ROTATOR CUFF STRAIN, RIGHT, SEQUELA: ICD-10-CM

## 2025-06-23 DIAGNOSIS — S46.012S ROTATOR CUFF STRAIN, LEFT, SEQUELA: Primary | ICD-10-CM

## 2025-06-23 PROCEDURE — 99202 OFFICE O/P NEW SF 15 MIN: CPT | Performed by: EMERGENCY MEDICINE

## 2025-06-23 PROCEDURE — 99204 OFFICE O/P NEW MOD 45 MIN: CPT | Performed by: EMERGENCY MEDICINE

## 2025-06-23 NOTE — PROGRESS NOTES
Subjective   Patient ID: Tami Johsnon is a 54 y.o. female who presents for Pain of the Left Shoulder and Pain of the Right Shoulder.  History of Present Illness  The patient presents for evaluation of bilateral shoulder pain.    She has been experiencing bilateral shoulder discomfort for the past year, with the left shoulder being more problematic than the right. The onset of her symptoms can be traced back to an incident in April 2024 when she was lifting her mother, who was unable to walk due to a hip issue. Despite receiving guidance on proper lifting techniques from her nurse cousins, she continued to experience pain, which she attributes to lifting her mother and pushing her wheelchair.    In October 2024, she sought medical attention and underwent an x-ray examination. Physical therapy was recommended, but due to her commitments at school and home, she was unable to attend. Her mother's condition deteriorated, leading to her death in November 2024. This necessitated additional lifting and care, which exacerbated her symptoms. She initiated physical therapy in January 2025, which she continued until approximately 1.5 months ago. Initially, her right shoulder was more affected due to the need to lift her mother from that side. However, during the course of therapy, her right shoulder improved while her left shoulder worsened.    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 She reports no history of falls or traumatic events. Her pain is not severe but can affect both shoulders depending on her activities. She has been avoiding certain activities but has recently resumed gardening. She also experiences pain in her right shoulder when driving long distances. The pain is located at the top of her shoulder and can radiate across her back. She reports that certain movements can trigger severe pain,  but she is unable to identify these movements. She has no history of diabetes and finds Advil effective for managing her pain.    She recalls a previous injury 5 years ago when she moved houses, which resulted in back and shoulder pain. She underwent therapy for this issue, which resolved after 4 years.    SOCIAL HISTORY  She works as a first-.    All other systems have been reviewed and are negative for complaint.      Objective     There were no vitals taken for this visit.     Physical Exam  - Musculoskeletal:    - Right shoulder:      - Full range of motion in all planes with full muscle strength      - Positive Mccloud and Neer tests      - Negative empty can, [place], Speed, and Yergason tests      - Tenderness at the subacromial space      - No tenderness over the bicipital groove    - Left shoulder:      - Limited active range of motion with forward flexion, abduction, and external rotation      - Full passive range of motion in all planes      - Positive Mccloud and Neer tests      - Negative [place], Speed, and Yergason tests      - Mild tenderness over the AC joint or subacromial space      - No tenderness over the bicipital groove    Imaging:   I have personally reviewed and agree with Radiologist interpretation of previous imaging studies performed prior to this visit.   STUDY:  XR SHOULDER 2+ VIEWS BILATERAL      INDICATION:  Signs/Symptoms:shoulde rpain.      COMPARISON:  None      ACCESSION NUMBER(S):  [id number]      ORDERING CLINICIAN:  PAYTON CHAN      FINDINGS:  No osseous, articular, or soft tissue abnormality.      IMPRESSION:  Normal radiographs bilateral shoulders.      Signed by: Efrain Sousa 10/5/2024 10:14 AM  Dictation workstation:   [id number]        1. Rotator cuff strain, left, sequela  MR shoulder left wo IV contrast      2. Rotator cuff strain, right, sequela            Assessment & Plan  1. Bilateral shoulder pain:  - Patient has been experiencing bilateral shoulder  pain for a year, with the left shoulder being more symptomatic.  - Pain began after lifting and caring for her mother with mobility issues.  - Physical therapy improved the right shoulder but worsened the left shoulder.  - Examination findings:    - Left shoulder: Limited active range of motion with forward flexion, abduction, and external rotation; full passive range of motion; tenderness at the subacromial space and mild tenderness over the AC joint; positive Mccloud and Neer tests; negative [place], Speed, and Yergason tests; no tenderness over the bicipital groove.    - Right shoulder: Full range of motion and muscle strength in all planes; positive Mccloud and Neer tests; negative empty can, [place], Speed, and Yergason tests; tenderness at the subacromial space; no tenderness over the bicipital groove.  - Likely diagnosis: Partial tearing of the rotator cuff.  - Plan:    - Right shoulder doing well at this time.    - Order MRI of the left shoulder to assess tendons.    - Consider cortisone injection based on MRI findings.    - Continue Advil for pain management as needed.    - Discuss alternative treatment options if MRI shows significant rotator cuff tear.    Tramaine Dai,          This medical note was created with the assistance of artificial intelligence (AI) for documentation purposes. The content has been reviewed and confirmed by the healthcare provider for accuracy and completeness. Patient consented to the use of audio recording and use of AI during their visit.

## 2025-07-03 DIAGNOSIS — K58.9 IRRITABLE BOWEL SYNDROME, UNSPECIFIED TYPE: ICD-10-CM

## 2025-07-03 RX ORDER — PHENOBARBITAL, HYOSCYAMINE SULFATE, ATROPINE SULFATE AND SCOPOLAMINE HYDROBROMIDE .0194; .1037; 16.2; .0065 MG/1; MG/1; MG/1; MG/1
TABLET ORAL
Qty: 60 TABLET | Refills: 2 | Status: SHIPPED | OUTPATIENT
Start: 2025-07-03

## 2025-07-07 ENCOUNTER — HOSPITAL ENCOUNTER (OUTPATIENT)
Dept: RADIOLOGY | Facility: CLINIC | Age: 55
Discharge: HOME | End: 2025-07-07
Payer: COMMERCIAL

## 2025-07-07 DIAGNOSIS — S46.012S ROTATOR CUFF STRAIN, LEFT, SEQUELA: ICD-10-CM

## 2025-07-07 PROCEDURE — 73221 MRI JOINT UPR EXTREM W/O DYE: CPT | Mod: LEFT SIDE | Performed by: RADIOLOGY

## 2025-07-07 PROCEDURE — 73221 MRI JOINT UPR EXTREM W/O DYE: CPT | Mod: LT

## 2025-07-21 ENCOUNTER — HOSPITAL ENCOUNTER (OUTPATIENT)
Dept: RADIOLOGY | Facility: EXTERNAL LOCATION | Age: 55
Discharge: HOME | End: 2025-07-21

## 2025-07-21 ENCOUNTER — OFFICE VISIT (OUTPATIENT)
Dept: ORTHOPEDIC SURGERY | Facility: HOSPITAL | Age: 55
End: 2025-07-21
Payer: COMMERCIAL

## 2025-07-21 DIAGNOSIS — M75.81 TENDINITIS OF BOTH ROTATOR CUFFS: ICD-10-CM

## 2025-07-21 DIAGNOSIS — M75.82 TENDINITIS OF BOTH ROTATOR CUFFS: ICD-10-CM

## 2025-07-21 DIAGNOSIS — S46.012S ROTATOR CUFF STRAIN, LEFT, SEQUELA: Primary | ICD-10-CM

## 2025-07-21 PROCEDURE — 20611 DRAIN/INJ JOINT/BURSA W/US: CPT | Mod: LT | Performed by: EMERGENCY MEDICINE

## 2025-07-21 PROCEDURE — 99214 OFFICE O/P EST MOD 30 MIN: CPT | Performed by: EMERGENCY MEDICINE

## 2025-07-21 PROCEDURE — 99213 OFFICE O/P EST LOW 20 MIN: CPT | Mod: 25 | Performed by: EMERGENCY MEDICINE

## 2025-07-21 PROCEDURE — 2500000004 HC RX 250 GENERAL PHARMACY W/ HCPCS (ALT 636 FOR OP/ED): Performed by: EMERGENCY MEDICINE

## 2025-07-21 RX ORDER — TRIAMCINOLONE ACETONIDE 40 MG/ML
80 INJECTION, SUSPENSION INTRA-ARTICULAR; INTRAMUSCULAR
Status: COMPLETED | OUTPATIENT
Start: 2025-07-21 | End: 2025-07-21

## 2025-07-21 RX ORDER — LIDOCAINE HYDROCHLORIDE 10 MG/ML
3 INJECTION, SOLUTION INFILTRATION; PERINEURAL
Status: COMPLETED | OUTPATIENT
Start: 2025-07-21 | End: 2025-07-21

## 2025-07-21 RX ADMIN — TRIAMCINOLONE ACETONIDE 80 MG: 200 INJECTION, SUSPENSION INTRA-ARTICULAR; INTRAMUSCULAR at 09:23

## 2025-07-21 RX ADMIN — LIDOCAINE HYDROCHLORIDE 3 ML: 10 INJECTION, SOLUTION INFILTRATION; PERINEURAL at 09:23

## 2025-07-21 ASSESSMENT — PAIN - FUNCTIONAL ASSESSMENT: PAIN_FUNCTIONAL_ASSESSMENT: NO/DENIES PAIN

## 2025-07-21 NOTE — PROGRESS NOTES
Subjective   Patient ID: Tami Johnson is a 54 y.o. female who presents for Follow-up of the Left Shoulder.  History of Present Illness  The patient returns today for left shoulder pain and for reevaluation of her left shoulder pain and MRI results.    She was last seen on 06/23/2025, when her right shoulder was doing well. However, she continued to experience significant discomfort and pain in her left shoulder, prompting an MRI. She had limited active range of motion and weakness in her left shoulder. Currently, she reports that her left shoulder is pain-free, but certain movements or activities can trigger sudden, shooting pain. As a right-handed individual, she finds it surprising how often she relies on her left hand. 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 She struggles with simple tasks such as reaching out to grab something while driving. The location of the pain varies, sometimes radiating across her back, and she experiences stiffness in her shoulder.     She has been informed about the possibility of having adhesive capsulitis during her therapy sessions. She has attempted physical therapy in the past, but it exacerbated her pain. After discontinuing therapy, her condition improved, but the pain returned when she resumed exercises, leading her to stop again. She also mentions occasional pain in her right shoulder, which is not as severe as it used to be.    SOCIAL HISTORY  She works as a first-.    All other systems have been reviewed and are negative for complaint.      Objective     There were no vitals taken for this visit.     Physical Exam  Musculoskeletal:  - Left shoulder:  - Limited active range of motion with forward flexion, abduction, and external rotation  - Full passive range of motion in all planes  - Positive Mccloud and Neer tests  - Negative [place], Speed, and Yergason tests  -  Mild tenderness over the AC joint or subacromial space  - No tenderness over the bicipital groove     Imaging:   I have personally reviewed and agree with Radiologist interpretation of previous imaging studies performed prior to this visit.   STUDY:  MRI of the left shoulder with out IV contrast      INDICATION:  Signs/Symptoms:left shoulder pain      COMPARISON:  Radiographs 10/04/2024.      ACCESSION NUMBER(S):  [id number]      ORDERING CLINICIAN:  BLAINE VELA      TECHNIQUE:  Multiplanar multisequence MRI of the left shoulder was performed  without intravenous contrast.      FINDINGS:  Acromioclavicular Joint: Normal acromioclavicular joint. Mild  subacromial subdeltoid bursitis with small amount of fluid in the  bursa.      Biceps Tendon: The intra and extra articular portion of the biceps  tendon are within normal limits.      Rotator Cuff:  Supraspinatus tendon is within normal limits.  Infraspinatus tendon is within normal limits.  Teres minor and subscapularis tendons are within normal limits.      Muscles:  Normal signal intensity and volume. No evidence of muscular  edema or atrophy.      Labrum:  Evaluation of labrum is limited due to lack of  intraarticular contrast. Circumferential chronic degenerative labral  tearing with blunting and truncation.      Articular Cartilage:  The articular cartilage of the glenoid and  humeral head are intact.      Bones:  The marrow signal of the humeral head is within normal  limits. No evidence acute fracture or contusion.      Nerves:  No evidence of mass effect in the region of the  quadrilateral space or suprascapular nerve.      Other: Thickening of the coracohumeral ligament with partial loss of  fat in the rotator interval. Small glenohumeral joint effusion.      IMPRESSION:  1. Thickening of the coracohumeral ligament with partial loss of fat  in the rotator interval which can be associated with adhesive  capsulitis.  2. Mild subacromial subdeltoid  bursitis  3. Circumferential chronic degenerative labral tearing      MACRO:  None.      Signed by: Allie Parry 7/7/2025 3:42 PM  Dictation workstation:   [id number]    Patient ID: Tami Johnson is a 54 y.o. female.    L Inj/Asp: L subacromial bursa on 7/21/2025 9:23 AM  Indications: pain  Details: 25 G needle, ultrasound-guided lateral approach  Medications: 80 mg triamcinolone acetonide 40 mg/mL; 3 mL lidocaine 10 mg/mL (1 %)  Outcome: tolerated well, no immediate complications  Procedure, treatment alternatives, risks and benefits explained, specific risks discussed. Consent was given by the patient. Immediately prior to procedure a time out was called to verify the correct patient, procedure, equipment, support staff and site/side marked as required. Patient was prepped and draped in the usual sterile fashion.             1. Tendinitis of both rotator cuffs  Referral to Physical Therapy      2. Rotator cuff strain, left, sequela  Point of Care Ultrasound          Assessment & Plan  1. Left shoulder pain:  - MRI results discussed: no significant rotator cuff tears, evidence of bursitis, adhesive capsulitis, and early signs of arthritis.  - Subacromial injection recommended and administered today.  - Avoid submerging shoulder in water for 2 days post-injection to prevent infection.  - Avoid heavy overhead lifting for 2 days.  - Resume exercises after 24 hours.  - Referral for physical therapy provided, encouraged to try exercises independently first.  - Return for a different type of injection if pain persists.    2. Right shoulder pain:  - Occasional pain reported depending on activities.  - Focus on treating left shoulder first to potentially alleviate right shoulder pain.  - Referral for physical therapy provided for both shoulders.  - Try exercises independently first, contact if additional assistance is needed.    PROCEDURE    Subacromial injection of the left shoulder was performed  today.    Tramaine Dai,          This medical note was created with the assistance of artificial intelligence (AI) for documentation purposes. The content has been reviewed and confirmed by the healthcare provider for accuracy and completeness. Patient consented to the use of audio recording and use of AI during their visit.

## 2025-08-20 ENCOUNTER — CLINICAL SUPPORT (OUTPATIENT)
Dept: PHYSICAL THERAPY | Facility: CLINIC | Age: 55
End: 2025-08-20
Payer: COMMERCIAL

## 2025-08-20 DIAGNOSIS — M75.81 TENDINITIS OF BOTH ROTATOR CUFFS: Primary | ICD-10-CM

## 2025-08-20 DIAGNOSIS — M75.82 TENDINITIS OF BOTH ROTATOR CUFFS: Primary | ICD-10-CM

## 2025-08-20 PROCEDURE — 97161 PT EVAL LOW COMPLEX 20 MIN: CPT | Mod: GP

## 2025-08-20 PROCEDURE — 97110 THERAPEUTIC EXERCISES: CPT | Mod: GP

## 2025-08-26 ENCOUNTER — DOCUMENTATION (OUTPATIENT)
Dept: PHYSICAL THERAPY | Facility: CLINIC | Age: 55
End: 2025-08-26
Payer: COMMERCIAL

## 2025-08-28 ENCOUNTER — APPOINTMENT (OUTPATIENT)
Dept: PHYSICAL THERAPY | Facility: CLINIC | Age: 55
End: 2025-08-28
Payer: COMMERCIAL

## 2025-09-03 ENCOUNTER — TREATMENT (OUTPATIENT)
Dept: PHYSICAL THERAPY | Facility: CLINIC | Age: 55
End: 2025-09-03
Payer: COMMERCIAL

## 2025-09-03 DIAGNOSIS — M75.81 TENDINITIS OF BOTH ROTATOR CUFFS: ICD-10-CM

## 2025-09-03 DIAGNOSIS — M75.82 TENDINITIS OF BOTH ROTATOR CUFFS: ICD-10-CM

## 2025-09-03 PROCEDURE — 97110 THERAPEUTIC EXERCISES: CPT | Mod: GP,CQ

## 2025-09-03 PROCEDURE — 97140 MANUAL THERAPY 1/> REGIONS: CPT | Mod: GP,CQ
